# Patient Record
Sex: FEMALE | Race: WHITE | NOT HISPANIC OR LATINO | URBAN - METROPOLITAN AREA
[De-identification: names, ages, dates, MRNs, and addresses within clinical notes are randomized per-mention and may not be internally consistent; named-entity substitution may affect disease eponyms.]

---

## 2023-04-05 LAB
BASOPHILS (10*3/UL) IN BLOOD BY AUTOMATED COUNT: 0.04 X10E9/L (ref 0–0.1)
BASOPHILS/100 LEUKOCYTES IN BLOOD BY AUTOMATED COUNT: 0.8 % (ref 0–2)
C REACTIVE PROTEIN (MG/L) IN SER/PLAS: 0.22 MG/DL
EOSINOPHILS (10*3/UL) IN BLOOD BY AUTOMATED COUNT: 0.05 X10E9/L (ref 0–0.4)
EOSINOPHILS/100 LEUKOCYTES IN BLOOD BY AUTOMATED COUNT: 1 % (ref 0–6)
ERYTHROCYTE DISTRIBUTION WIDTH (RATIO) BY AUTOMATED COUNT: 13.5 % (ref 11.5–14.5)
ERYTHROCYTE MEAN CORPUSCULAR HEMOGLOBIN CONCENTRATION (G/DL) BY AUTOMATED: 32.6 G/DL (ref 32–36)
ERYTHROCYTE MEAN CORPUSCULAR VOLUME (FL) BY AUTOMATED COUNT: 92 FL (ref 80–100)
ERYTHROCYTES (10*6/UL) IN BLOOD BY AUTOMATED COUNT: 4.31 X10E12/L (ref 4–5.2)
FIBRINOGEN (MG/DL) IN PPP BY COAGULATION ASSAY: 255 MG/DL (ref 200–400)
HEMATOCRIT (%) IN BLOOD BY AUTOMATED COUNT: 39.6 % (ref 36–46)
HEMOGLOBIN (G/DL) IN BLOOD: 12.9 G/DL (ref 12–16)
IMMATURE GRANULOCYTES/100 LEUKOCYTES IN BLOOD BY AUTOMATED COUNT: 0.2 % (ref 0–0.9)
LEUKOCYTES (10*3/UL) IN BLOOD BY AUTOMATED COUNT: 5.2 X10E9/L (ref 4.4–11.3)
LYMPHOCYTES (10*3/UL) IN BLOOD BY AUTOMATED COUNT: 1.58 X10E9/L (ref 0.8–3)
LYMPHOCYTES/100 LEUKOCYTES IN BLOOD BY AUTOMATED COUNT: 30.6 % (ref 13–44)
MONOCYTES (10*3/UL) IN BLOOD BY AUTOMATED COUNT: 0.3 X10E9/L (ref 0.05–0.8)
MONOCYTES/100 LEUKOCYTES IN BLOOD BY AUTOMATED COUNT: 5.8 % (ref 2–10)
NEUTROPHILS (10*3/UL) IN BLOOD BY AUTOMATED COUNT: 3.18 X10E9/L (ref 1.6–5.5)
NEUTROPHILS/100 LEUKOCYTES IN BLOOD BY AUTOMATED COUNT: 61.6 % (ref 40–80)
NRBC (PER 100 WBCS) BY AUTOMATED COUNT: 0 /100 WBC (ref 0–0)
PLATELETS (10*3/UL) IN BLOOD AUTOMATED COUNT: 301 X10E9/L (ref 150–450)
SEDIMENTATION RATE, ERYTHROCYTE: 12 MM/H (ref 0–30)

## 2023-05-23 LAB
ALANINE AMINOTRANSFERASE (SGPT) (U/L) IN SER/PLAS: 19 U/L (ref 7–45)
ALBUMIN (G/DL) IN SER/PLAS: 3.9 G/DL (ref 3.4–5)
ALKALINE PHOSPHATASE (U/L) IN SER/PLAS: 52 U/L (ref 33–136)
ANION GAP IN SER/PLAS: 14 MMOL/L (ref 10–20)
ASPARTATE AMINOTRANSFERASE (SGOT) (U/L) IN SER/PLAS: 21 U/L (ref 9–39)
BASOPHILS (10*3/UL) IN BLOOD BY AUTOMATED COUNT: 0.04 X10E9/L (ref 0–0.1)
BASOPHILS/100 LEUKOCYTES IN BLOOD BY AUTOMATED COUNT: 0.8 % (ref 0–2)
BILIRUBIN TOTAL (MG/DL) IN SER/PLAS: 0.7 MG/DL (ref 0–1.2)
CALCIUM (MG/DL) IN SER/PLAS: 9.8 MG/DL (ref 8.6–10.6)
CARBON DIOXIDE, TOTAL (MMOL/L) IN SER/PLAS: 26 MMOL/L (ref 21–32)
CHLORIDE (MMOL/L) IN SER/PLAS: 107 MMOL/L (ref 98–107)
CHOLESTEROL (MG/DL) IN SER/PLAS: 233 MG/DL (ref 0–199)
CHOLESTEROL IN HDL (MG/DL) IN SER/PLAS: 72.9 MG/DL
CHOLESTEROL/HDL RATIO: 3.2
CREATININE (MG/DL) IN SER/PLAS: 0.72 MG/DL (ref 0.5–1.05)
EOSINOPHILS (10*3/UL) IN BLOOD BY AUTOMATED COUNT: 0.09 X10E9/L (ref 0–0.4)
EOSINOPHILS/100 LEUKOCYTES IN BLOOD BY AUTOMATED COUNT: 1.8 % (ref 0–6)
ERYTHROCYTE DISTRIBUTION WIDTH (RATIO) BY AUTOMATED COUNT: 14.5 % (ref 11.5–14.5)
ERYTHROCYTE MEAN CORPUSCULAR HEMOGLOBIN CONCENTRATION (G/DL) BY AUTOMATED: 32 G/DL (ref 32–36)
ERYTHROCYTE MEAN CORPUSCULAR VOLUME (FL) BY AUTOMATED COUNT: 94 FL (ref 80–100)
ERYTHROCYTES (10*6/UL) IN BLOOD BY AUTOMATED COUNT: 3.85 X10E12/L (ref 4–5.2)
GFR FEMALE: 85 ML/MIN/1.73M2
GLUCOSE (MG/DL) IN SER/PLAS: 94 MG/DL (ref 74–99)
HEMATOCRIT (%) IN BLOOD BY AUTOMATED COUNT: 36.3 % (ref 36–46)
HEMOGLOBIN (G/DL) IN BLOOD: 11.6 G/DL (ref 12–16)
IMMATURE GRANULOCYTES/100 LEUKOCYTES IN BLOOD BY AUTOMATED COUNT: 0.2 % (ref 0–0.9)
LDL: 137 MG/DL (ref 0–99)
LEUKOCYTES (10*3/UL) IN BLOOD BY AUTOMATED COUNT: 4.9 X10E9/L (ref 4.4–11.3)
LYMPHOCYTES (10*3/UL) IN BLOOD BY AUTOMATED COUNT: 1.18 X10E9/L (ref 0.8–3)
LYMPHOCYTES/100 LEUKOCYTES IN BLOOD BY AUTOMATED COUNT: 24.2 % (ref 13–44)
MONOCYTES (10*3/UL) IN BLOOD BY AUTOMATED COUNT: 0.23 X10E9/L (ref 0.05–0.8)
MONOCYTES/100 LEUKOCYTES IN BLOOD BY AUTOMATED COUNT: 4.7 % (ref 2–10)
NEUTROPHILS (10*3/UL) IN BLOOD BY AUTOMATED COUNT: 3.32 X10E9/L (ref 1.6–5.5)
NEUTROPHILS/100 LEUKOCYTES IN BLOOD BY AUTOMATED COUNT: 68.3 % (ref 40–80)
NRBC (PER 100 WBCS) BY AUTOMATED COUNT: 0 /100 WBC (ref 0–0)
PLATELETS (10*3/UL) IN BLOOD AUTOMATED COUNT: 276 X10E9/L (ref 150–450)
POTASSIUM (MMOL/L) IN SER/PLAS: 3.8 MMOL/L (ref 3.5–5.3)
PROTEIN TOTAL: 6.6 G/DL (ref 6.4–8.2)
SODIUM (MMOL/L) IN SER/PLAS: 143 MMOL/L (ref 136–145)
THYROTROPIN (MIU/L) IN SER/PLAS BY DETECTION LIMIT <= 0.05 MIU/L: 1.13 MIU/L (ref 0.44–3.98)
TRIGLYCERIDE (MG/DL) IN SER/PLAS: 117 MG/DL (ref 0–149)
UREA NITROGEN (MG/DL) IN SER/PLAS: 9 MG/DL (ref 6–23)
VLDL: 23 MG/DL (ref 0–40)

## 2024-02-05 PROBLEM — H65.92 FLUID LEVEL BEHIND TYMPANIC MEMBRANE OF LEFT EAR: Status: ACTIVE | Noted: 2024-02-05

## 2024-02-05 PROBLEM — H93.8X2 SENSATION OF PLUGGED EAR ON LEFT SIDE: Status: ACTIVE | Noted: 2024-02-05

## 2024-02-05 PROBLEM — H93.12 TINNITUS, LEFT EAR: Status: ACTIVE | Noted: 2024-02-05

## 2024-02-05 RX ORDER — MIRABEGRON 50 MG/1
1 TABLET, EXTENDED RELEASE ORAL DAILY
COMMUNITY
Start: 2022-02-25

## 2024-02-05 RX ORDER — HYDROCHLOROTHIAZIDE 12.5 MG/1
1 TABLET ORAL DAILY
COMMUNITY
Start: 2021-03-16

## 2024-02-05 RX ORDER — ATORVASTATIN CALCIUM 10 MG/1
1 TABLET, FILM COATED ORAL DAILY
COMMUNITY
Start: 2021-12-27

## 2024-02-05 RX ORDER — ELECTROLYTES/DEXTROSE
SOLUTION, ORAL ORAL
COMMUNITY

## 2024-02-05 RX ORDER — LOSARTAN POTASSIUM 100 MG/1
1 TABLET ORAL DAILY
COMMUNITY
Start: 2022-05-24

## 2024-02-05 RX ORDER — FERROUS GLUCONATE 256(28)MG
TABLET ORAL
COMMUNITY

## 2024-02-05 RX ORDER — PNV NO.95/FERROUS FUM/FOLIC AC 28MG-0.8MG
TABLET ORAL
COMMUNITY

## 2024-02-07 ENCOUNTER — OFFICE VISIT (OUTPATIENT)
Dept: OTOLARYNGOLOGY | Facility: CLINIC | Age: 79
End: 2024-02-07
Payer: MEDICARE

## 2024-02-07 VITALS
HEIGHT: 62 IN | DIASTOLIC BLOOD PRESSURE: 73 MMHG | WEIGHT: 193.8 LBS | RESPIRATION RATE: 16 BRPM | SYSTOLIC BLOOD PRESSURE: 150 MMHG | BODY MASS INDEX: 35.66 KG/M2 | TEMPERATURE: 97.9 F | HEART RATE: 66 BPM | OXYGEN SATURATION: 99 %

## 2024-02-07 DIAGNOSIS — R05.9 COUGH, UNSPECIFIED TYPE: ICD-10-CM

## 2024-02-07 DIAGNOSIS — R49.0 MUSCLE TENSION DYSPHONIA: ICD-10-CM

## 2024-02-07 DIAGNOSIS — R49.9 CHANGE IN VOICE: Primary | ICD-10-CM

## 2024-02-07 PROBLEM — I10 HYPERTENSION: Status: ACTIVE | Noted: 2024-02-07

## 2024-02-07 PROCEDURE — 3077F SYST BP >= 140 MM HG: CPT | Performed by: NURSE PRACTITIONER

## 2024-02-07 PROCEDURE — 1159F MED LIST DOCD IN RCRD: CPT | Performed by: NURSE PRACTITIONER

## 2024-02-07 PROCEDURE — 31575 DIAGNOSTIC LARYNGOSCOPY: CPT | Performed by: NURSE PRACTITIONER

## 2024-02-07 PROCEDURE — 99214 OFFICE O/P EST MOD 30 MIN: CPT | Performed by: NURSE PRACTITIONER

## 2024-02-07 PROCEDURE — 3078F DIAST BP <80 MM HG: CPT | Performed by: NURSE PRACTITIONER

## 2024-02-07 PROCEDURE — 1036F TOBACCO NON-USER: CPT | Performed by: NURSE PRACTITIONER

## 2024-02-07 PROCEDURE — 1126F AMNT PAIN NOTED NONE PRSNT: CPT | Performed by: NURSE PRACTITIONER

## 2024-02-07 RX ORDER — PANTOPRAZOLE SODIUM 40 MG/1
TABLET, DELAYED RELEASE ORAL
COMMUNITY
Start: 2014-08-24

## 2024-02-07 RX ORDER — AMOXICILLIN 500 MG/1
CAPSULE ORAL
COMMUNITY
Start: 2012-01-24

## 2024-02-07 RX ORDER — METHYLPREDNISOLONE 4 MG/1
TABLET ORAL
Qty: 21 TABLET | Refills: 0 | Status: SHIPPED | OUTPATIENT
Start: 2024-02-07 | End: 2024-02-14

## 2024-02-07 SDOH — ECONOMIC STABILITY: FOOD INSECURITY: WITHIN THE PAST 12 MONTHS, YOU WORRIED THAT YOUR FOOD WOULD RUN OUT BEFORE YOU GOT MONEY TO BUY MORE.: NEVER TRUE

## 2024-02-07 SDOH — ECONOMIC STABILITY: FOOD INSECURITY: WITHIN THE PAST 12 MONTHS, THE FOOD YOU BOUGHT JUST DIDN'T LAST AND YOU DIDN'T HAVE MONEY TO GET MORE.: NEVER TRUE

## 2024-02-07 ASSESSMENT — PATIENT HEALTH QUESTIONNAIRE - PHQ9
2. FEELING DOWN, DEPRESSED OR HOPELESS: NOT AT ALL
1. LITTLE INTEREST OR PLEASURE IN DOING THINGS: NOT AT ALL
SUM OF ALL RESPONSES TO PHQ9 QUESTIONS 1 AND 2: 0

## 2024-02-07 ASSESSMENT — LIFESTYLE VARIABLES
AUDIT TOTAL SCORE: 1
HAS A RELATIVE, FRIEND, DOCTOR, OR ANOTHER HEALTH PROFESSIONAL EXPRESSED CONCERN ABOUT YOUR DRINKING OR SUGGESTED YOU CUT DOWN: NO
AUDIT-C TOTAL SCORE: 1
HOW MANY STANDARD DRINKS CONTAINING ALCOHOL DO YOU HAVE ON A TYPICAL DAY: 1 OR 2
HOW OFTEN DO YOU HAVE SIX OR MORE DRINKS ON ONE OCCASION: NEVER
SKIP TO QUESTIONS 9-10: 1
HOW OFTEN DO YOU HAVE A DRINK CONTAINING ALCOHOL: MONTHLY OR LESS
HAVE YOU OR SOMEONE ELSE BEEN INJURED AS A RESULT OF YOUR DRINKING: NO

## 2024-02-07 ASSESSMENT — COLUMBIA-SUICIDE SEVERITY RATING SCALE - C-SSRS
2. HAVE YOU ACTUALLY HAD ANY THOUGHTS OF KILLING YOURSELF?: NO
6. HAVE YOU EVER DONE ANYTHING, STARTED TO DO ANYTHING, OR PREPARED TO DO ANYTHING TO END YOUR LIFE?: NO
1. IN THE PAST MONTH, HAVE YOU WISHED YOU WERE DEAD OR WISHED YOU COULD GO TO SLEEP AND NOT WAKE UP?: NO

## 2024-02-07 ASSESSMENT — ENCOUNTER SYMPTOMS
DEPRESSION: 0
LOSS OF SENSATION IN FEET: 0
OCCASIONAL FEELINGS OF UNSTEADINESS: 0

## 2024-02-07 ASSESSMENT — PAIN SCALES - GENERAL: PAINLEVEL: 0-NO PAIN

## 2024-02-07 NOTE — PROGRESS NOTES
Subjective   Patient ID: Lonnie Lehman is a 78 y.o. female who presents for Ear Fullness and Laryngitis.    HPI  INITIAL VISIT 6/13/2022:  Lonnie Lehman is a 76 year-old female here for evaluation of her left ear. She is having hearing loss in the left ear that started about 1 month ago. She did have ticks on her around that time. She did have a cold before the hearing loss. No ear pain. No ear drainage. She did have mild blood from the ear a few weeks ago. No tinnitus. She feels something in her ear. Sometimes when she lays down she can get dizzy. No previous ear surgery. No loud noise exposure. No family history of hearing loss.     2/7/2024:  She had COVID 12/12/2023. Prior to that she was having some clogged sensation in the right ear and a pulsing. Called PCP when she had COVID and was prescribed Paxlovid. She was coughing a lot and lost her voice around [place]. She is still having some voice changes. No throat pain or trouble swallowing. Having to throat clear in the mornings.   She has been doing Flonase and Mucinex and Lozangez.  She was on amoxicillin for a dental infection.   She takes Pantoprazole daily.    Patient Active Problem List   Diagnosis    Tinnitus, left ear    Sensation of plugged ear on left side    Fluid level behind tympanic membrane of left ear    Cataracts, both eyes    Hypertension    Otalgia of left ear    Preglaucoma    Vitreous floaters     History reviewed. No pertinent surgical history.    Review of Systems    All other systems have been reviewed and are negative for complaints except for those mentioned in history of present illness, past medical history and problem list.    Objective   Physical Exam    Constitutional: No fever, chills, weight loss or weight gain  General appearance: Appears well, well-nourished, well groomed. No acute distress.    Communication: Normal communication    Psychiatric: Oriented to person, place and time. Normal mood and affect.    Neurologic:  Cranial nerves II-XII grossly intact and symmetric bilaterally.    Head and Face:  Head: Atraumatic with no masses, lesions or scarring.  Face: Normal symmetry. No scars or deformities.  TMJ: Normal, no trismus.    Eyes: Conjunctiva not edematous or erythematous.     Right Ear: External inspection of ear with no deformity, scars, or masses. EAC is clear.  TM is intact with no sign of infection, effusion, or retraction.  No perforation seen.     Left Ear: External inspection of ear with no deformity, scars, or masses. EAC is clear.  TM is intact with no sign of infection, effusion, or retraction.  No perforation seen.     Nose: External inspection of nose: No nasal lesions, lacerations or scars. Anterior rhinoscopy with limited visualization past the inferior turbinates. No tenderness on frontal or maxillary sinus palpation.    Oral Cavity/Mouth: Oral cavity and oropharynx mucosa moist and pink. No lesions or masses. Dentition normal. Tonsils appear normal. Uvula is midline. Tongue with no masses or lesions. Tongue with good mobility. The oropharynx is clear.    Neck: Normal appearing, symmetric, trachea midline.     Cardiovascular: Examination of peripheral vascular system shows no clubbing or cyanosis.    Respiratory: No respiratory distress increased work of breathing. Inspection of the chest with symmetric chest expansion and normal respiratory effort.    Skin: No head and neck rashes.    Lymph nodes: No adenopathy.     Laryngoscopy    Date/Time: 2/7/2024 4:11 PM    Performed by: BLANCO Carlisle  Authorized by: BLANCO Carlisle    Procedure details:     Indications: hoarseness, dysphagia, or aspiration      Medication:  Afrin (4% topical lidocaine)    Instrument: flexible fiberoptic laryngoscope      Scope location: right nare    Sinus:     Right nasopharynx:       normal    Left nasopharynx:       normal  Mouth:     Posterior pharyngeal wall: normal      Oropharynx:       normal       Vallecula:       normal      Base of tongue:       normal      Epiglottis:       normal    Throat:     Right hypopharynx:       Positive for: inflammation      Left hypopharynx:       Positive for: inflammation      Pyriform sinus:       normal      False vocal cords:       normal      True vocal cords:       normal        Positive for: inflammation        Pertinent negatives: no immobility, no reduced mobility and no lesion      Assessment/Plan   Diagnoses and all orders for this visit:  Laryngoscopy performed. Mild inflammation of the larynx. No growth, cords are moving well. I feel her voice changes are secondary to COVID 19- and now she is having some MTD. Also may be secondary to her lingering cough from COVID.   Change in voice, cough  -     methylPREDNISolone (Medrol Dospak) 4 mg tablets; Follow schedule on package instructions. Patient denies diabetes or cardiac issues.   -     Referral to Speech Therapy; Future    Patient will update me after completing Medrol Dosepak, and we will plan to follow up after BLANCO Bear 02/07/24 1:07 PM

## 2024-02-13 ENCOUNTER — EVALUATION (OUTPATIENT)
Dept: SPEECH THERAPY | Facility: CLINIC | Age: 79
End: 2024-02-13
Payer: MEDICARE

## 2024-02-13 DIAGNOSIS — R49.0 DYSPHONIA: ICD-10-CM

## 2024-02-13 DIAGNOSIS — R49.9 CHANGE IN VOICE: Primary | ICD-10-CM

## 2024-02-13 PROBLEM — R49.1 APHONIA: Status: ACTIVE | Noted: 2024-02-13

## 2024-02-13 PROCEDURE — 92524 BEHAVRAL QUALIT ANALYS VOICE: CPT | Mod: GN | Performed by: SPEECH-LANGUAGE PATHOLOGIST

## 2024-02-13 ASSESSMENT — PAIN - FUNCTIONAL ASSESSMENT: PAIN_FUNCTIONAL_ASSESSMENT: 0-10

## 2024-02-13 ASSESSMENT — PAIN SCALES - GENERAL: PAINLEVEL_OUTOF10: 0 - NO PAIN

## 2024-02-13 NOTE — PROGRESS NOTES
Speech-Language Pathology    Adult Outpatient Speech-Language Voice Assessment    Patient Name: Lonnie Lehman  MRN: [id number]  : 1945  Today's Date: 24  Time Calculation  Start Time: 1345  Stop Time: 1445  Time Calculation (min): 60 min    Current Problem:   Dysphonia    SLP Assessment:  Pt. Is a pleasant 78 year old female.  She presents with a severe dysphonia.  Pt reported a change in voice after having COVID with an increased cough, 2023. Pt demonstrated a quiet, strained voice this date with some vocal hitching and diplophonia during s/z ratio and maximum phonation time assessments.   Pt reported to be a frequent voice user as she takes care of her three grandchildren aged 18, 15, and 8 years old. She also reported taking care of her , who is hard of hearing. Pt reported talking to him up close, and at times, with an increased volume.     Clinician provided education on ways to reduce vocal usage and strain.   Clinician recommended that the pt complete 5 days of vocal rest, to aid in this clinician suggested texting/writing things down to communicate with others during vocal rest. Clinician also educated pt on effective whispering (whispering without a voice) to aid in the recovery process.     Lonnie Lehman will benefit from skilled speech therapy at this time to address above deficits.     Related Medical History:  Pt had COVID in 2023. She had a lingering cough after COVID, resulting in some voice changes. Pt had a laryngoscopy on 24, pt was found to have mild inflammation of the larynx, with vocal folds moving well per ENT report. Pt reported having hearing loss in left ear in addition to COVID, then switching to her right ear. Pt reported that there was pain in the left ear, however this problem has been resolved.    SLP Plan:  Lonnie Lehman is recommended to be seen for Skilled Speech Therapy two times per week for six weeks.  This was reviewed with family and they  "are in agreement of this.     Goals:  By discharge Lonnie Lehman will:  Long Term Goal(s):   1. Patient will return to prior level of function with voicing to communicate with a clear vocal quality to be able to be understood by others to get her wants/needs met effectively.  Short Term Goal(s):   1. Patient will practice respiratory exercises with return demo with 90% accuracy. Set 2/13/24.  2. Patient will be able to phonate with good vocal quality for sustained phonation at 5-10 seconds with mod cues on 90% of trials.  Set 2/13/24.  3.  Patient will be able to achieve voicing at the word level of speech at 90% with mod cues.  Set 2/13/24.   4. Patient will complete vocal cord relaxation exercises with return demo with 90% accuracy. Set 2/13/24.  5. Patient will increase breath support to maintain sustained phonation of \"ah\" for 14.4 seconds with 90% accuracy, independently. Set 2/13/24.  6. Patient will demonstrate understanding and importance of vocal hygiene with visual supports and return demonstration at 90% accuracy. Set 2/13/24.  7. Patient will complete 4-5 days of vocal rest to increase ability to return to PLOF. Set 2/13/24.  8. Patient/family education to be provided each session. Set 2/13/24.    Prognosis: Good  Pt/family agrees to the reviewed goals and Plan for therapy.    Plan of care was developed with input and agreement by the pt/family.    Subjective:   Lonnie Lehman was seen 1-on-1 this date.  Lonnie participated well throughout the evaluation.  Date ranges for therapy: 2/12/24-4/13/24  Referred By: Jannette Whitt CNP  Date of Onset: 12/13/23  Reason for Referral: Voice Changes  Chief Complaint: Unable to speak   Prior Level of Function: WFL  Previous Therapies: None  Respiratory Status: Room Air  Hearing: WFL  Vision: WFL  No overt symptoms/signs of abuse/neglect.   Precautions: None  Pt/family prefer to learn via demonstration, printed materials, performance, and " explanation/discussion.    Pain:  Pain Assessment: 0-10   Pain Score: 0 - No pain    Insurance:  Reviewed: Yes  Number of Authorized Treatments : auth required after eval  Total Number of Visits: 1  Certification Period: 24 Endin24    Objective:  Sustained phonation was utilized this date and pt achieved an average of 8.89 seconds across three trials.  This is compared to norms of females aged 65 and older with a median score of 14.4 seconds.     Pt completed a full Voice Handicap Index.  Pt scored as follows:  Functional: 22/40  Physical 31/40  Emotional 35/40  The lower the score, the less notable the concern is for the pt at this time.  VHI values of 0-11 are classified as grade 0 suffering (almost certainly not noticeable), while values of 12-28 reflect grade 1 (more likely unnoticeable than conspicuous) suffering; values of 29-56 reflect grade 2 suffering (more probably noticeable than not), and values of  suggest a classification of certainly noticeable and are graded as grade 3 suffering.  Pt is currently at a grade 3. Pt did express having increased difficulty with others hearing her, as well as wanting to interact with others. Pt reported feeling angry about her voice, and inability to speak. Pt also reported pain with speaking, and that her voice gets worse throughout the day.     A S/Z ratio was obtained this date of 14.75/8.13=1.18.  WFL is being able to sustain both 'S' and 'Z' for about 20-25 seconds and a ratio of 1.  This is indicative of laryngeal impairment, characterized by a quiet strained voice.     The results of this evaluation indicate a severe dysphonia characterized by strain, and inability to sustain voicing. This is due to previous medical history of COVID, with a persistent cough as well as vocal misuse. 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 Pt is not using adequate vocal hygiene. Pt reports taking  Flonase and Mucinex following COVID recovery. Pt is currently on a steroid to reduce inflammation and swelling.     Treatment Provided:   No treatment provided this date. Education was provided on ways to improve voice at home including quiet whisper, and vocal rest for 4-5 days.     Outpatient Education:   Reviewed results of today's assessment.    Reviewed plan for Therapy and Lonnie Lehman and family were in agreement of this.     Addie Avina - Student Clinician

## 2024-02-14 PROBLEM — R49.0 DYSPHONIA: Status: ACTIVE | Noted: 2024-02-14

## 2024-02-23 ENCOUNTER — TREATMENT (OUTPATIENT)
Dept: SPEECH THERAPY | Facility: CLINIC | Age: 79
End: 2024-02-23
Payer: MEDICARE

## 2024-02-23 DIAGNOSIS — R49.0 DYSPHONIA: Primary | ICD-10-CM

## 2024-02-23 PROCEDURE — 2700000081 HC SLP EMST 150 EXP MUSCLE STRGTH TRAINER: Performed by: SPEECH-LANGUAGE PATHOLOGIST

## 2024-02-23 PROCEDURE — 92507 TX SP LANG VOICE COMM INDIV: CPT | Mod: GN | Performed by: SPEECH-LANGUAGE PATHOLOGIST

## 2024-02-23 ASSESSMENT — PAIN SCALES - GENERAL: PAINLEVEL_OUTOF10: 0 - NO PAIN

## 2024-02-23 ASSESSMENT — PAIN - FUNCTIONAL ASSESSMENT: PAIN_FUNCTIONAL_ASSESSMENT: 0-10

## 2024-02-23 NOTE — PROGRESS NOTES
Speech-Language Pathology    Outpatient Speech-Language Pathology Treatment    Patient Name: Lonnie Lehman  MRN: [id number]  : 1945  Today's Date: 24  Time Calculation  Start Time: 730  Stop Time: 815  Time Calculation (min): 45 min    Current Problem:  Dysphonia     SLP Assessment  Pt was provided with an EMST-150 this date. Education was provided on how to use, and wash the device. Pt completed 5 reps of 5 with good effort.   Pt completed 20 yawn-sighs this date with good effort. Pt benefited from moderate verbal and visual cues to phonate with good vocal quality throughout this exercise.  Pt reported feeling stained this date, partial to inadequate vocal usage. Pt also reporting buying a whiteboard to write on to reduce vocal usage in communication with others.   Education was provided about vocal hygiene, and its benefits throughout the body, and voice. Pt expressed understanding of this.     Pt completed the MoCA this date, scoring a 17/30. A normal score is >26 out of 30. This indicates a moderate cognitive disorder, characterized by deficits in memory and attention.     Goals to be added:     LTGs:  Pt will increase cognitive linguistic skills to be able to fully participate in daily tasks without deficit at PLOF. SET 24.    STGs:  Patient will demonstrate an improvement in short term memory abilities by recalling 5-7 pieces of information with/without delay and with/without distraction each session.   SET 24.     Patient will learn and use memory/cognitive compensatory strategies with return demo with 90% accuracy.  SET 24.     3. Patient will be able to complete high level problem solving tasks (e.g. divergent/convergent tasks, sequencing, reasoning, etc.) at 80% with mod cues.   SET 24.    Plan  SLP Tx Plan:  Continue with POC.   SLP Plan: Skilled SLP  SLP Frequency: 1x/week  Duration: 12 weeks.    Subjective  Lonnie Lehman was pleasant and participated well throughout  "the session.     General Visit Info  Certification Period Start Date: 02/13/24 Certification Period End Date: 05/13/24   Number of Authorized Treatments : auth required after eval  Total Number of Visits : 2  Insurance Reviewed this date:  yes     Pain  Pain Assessment: Pain Assessment: 0-10  Pain Score: Pain Score: 0 - No pain    Objective  By discharge Lonnie Lehman will:  Long Term Goal(s):   1. Patient will return to prior level of function with voicing to communicate with a clear vocal quality to be able to be understood by others to get her wants/needs met effectively.    Short Term Goal(s):   1. Patient will practice respiratory exercises with return demo with 90% accuracy. Set 2/13/24.  ONGOING. 80% accuracy, increased to 90% with min to mod cues.     2. Patient will be able to phonate with good vocal quality for sustained phonation at 5-10 seconds with mod cues on 90% of trials.  Set 2/13/24.  ONGOING. Phonate during yawn-sigh exercise for 1-2 seconds at 40% accuracy, increased to 60% with mod cues.  This was still slightly rough in quality at times.     3.  Patient will be able to achieve voicing at the word level of speech at 90% with mod cues.  Set 2/13/24.   NOT ADDRESSED.     4. Patient will complete vocal cord relaxation exercises with return demo with 90% accuracy. Set 2/13/24.  ONGOING. Completed at 60% accuracy independently, increased to 80% with mod cues.     5. Patient will increase breath support to maintain sustained phonation of \"ah\" for 14.4 seconds with 90% accuracy, independently. Set 2/13/24.  NOT ADDRESSED.     6. Patient will demonstrate understanding and importance of vocal hygiene with visual supports and return demonstration at 90% accuracy. Set 2/13/24.  ONGOING. Pt expressed understanding after reviewing vocal hygiene handout.     7. Patient will complete 4-5 days of vocal rest to increase ability to return to PLOF. Set 2/13/24.  GOAL MET. Pt reported not utilizing complete vocal " rest, but a confidential voice and dry erase board to communicate with others.  She is continuing to use a soft voice at home aside from when targeting voicing homework.     8. Patient/family education to be provided each session. Set 2/13/24.  ONGOING.   Education  Education was provided to pt/family and reviewed how to carryover strategies learned in session to home.      Addie Avina - Student Clinician

## 2024-02-26 ENCOUNTER — APPOINTMENT (OUTPATIENT)
Dept: SPEECH THERAPY | Facility: CLINIC | Age: 79
End: 2024-02-26
Payer: MEDICARE

## 2024-02-28 ENCOUNTER — TREATMENT (OUTPATIENT)
Dept: SPEECH THERAPY | Facility: CLINIC | Age: 79
End: 2024-02-28
Payer: MEDICARE

## 2024-02-28 ENCOUNTER — APPOINTMENT (OUTPATIENT)
Dept: OTOLARYNGOLOGY | Facility: CLINIC | Age: 79
End: 2024-02-28
Payer: MEDICARE

## 2024-02-28 DIAGNOSIS — R49.0 DYSPHONIA: ICD-10-CM

## 2024-02-28 PROCEDURE — 92507 TX SP LANG VOICE COMM INDIV: CPT | Mod: GN | Performed by: SPEECH-LANGUAGE PATHOLOGIST

## 2024-02-28 ASSESSMENT — PAIN - FUNCTIONAL ASSESSMENT: PAIN_FUNCTIONAL_ASSESSMENT: 0-10

## 2024-02-28 ASSESSMENT — PAIN SCALES - GENERAL: PAINLEVEL_OUTOF10: 0 - NO PAIN

## 2024-02-28 NOTE — PROGRESS NOTES
"Speech-Language Pathology    Outpatient Speech-Language Pathology Treatment    Patient Name: Lonnie Lehman  MRN: [id number]  : 1945  Today's Date: 24  Time Calculation  Start Time: 915  Stop Time: 1000  Time Calculation (min): 45 min    Current Problem:  Dysphonia     SLP Assessment  Pt completed 25 yawn-sighs thus date with good effort. Pt benefited from max verbal and visual cues to use diaphragm to produce good vocal quality.   Pt participated in adduction exercises this date. Pt was asked to \"bear down\" using the arms of a chair, or holding hands together and pulling apart, while making a \"huh\" like noise. Pt benefited from max verbal and visual cues to use diaphragm in this exercise. Pt gained independence accurately completing adduction and yawn-sigh exercises as the session continued, she did still benefit from moderate verbal and visual cues to complete fully.     Pt was able to voice at the word and phrase level of speech this date independently. However; benefited from max verbal, visual, and written cues to use diaphragm. When not utilizing appropriate breath support, pt would often self-correct breathing to use of diaphragm.   Pt expressed difficulty using the EMST-150 at home, but did not bring to the session this date. At the moment, it is unclear if she is using the EMST-150 appropriately, and she is unsure if she is using appropriately. Pt was reminded to bring this to each session to practice breath support, and use of diaphragm.     Education was provided about using diaphragm for breathing, and while talking for increased breath support. Strategies and exercises were written down for pt for easier recall.     Plan  SLP Tx Plan:  Continue with POC.   SLP Plan: Skilled SLP  SLP Frequency: 1x/week  Duration: 12 weeks.    Subjective  Lonnie Lehman was pleasant and participated well throughout the session.     General Visit Info  Certification Period Start Date: 24 Certification " "Period End Date: 05/13/24   Number of Authorized Treatments : auth required after eval  Total Number of Visits : 3  Insurance Reviewed this date:  yes     Pain  Pain Assessment: 0-10  Pain Score: 0 - No pain    Objective  By discharge Lonnie Lehman will:  Long Term Goal(s):   1. Patient will return to prior level of function with voicing to communicate with a clear vocal quality to be able to be understood by others to get her wants/needs met effectively.    2. Pt will increase cognitive linguistic skills to be able to fully participate in daily tasks without deficit at PLOF.    Short Term Goal(s):   1. Patient will practice respiratory exercises with return demo with 90% accuracy.   ONGOING. Pt was able to engage diaphragm in tasks at 80% with mod to max cues.     2. Patient will be able to phonate with good vocal quality for sustained phonation at 5-10 seconds with mod cues on 90% of trials.  ONGOING.  On words in conversation at 60% with max cues and on /ah/ for 2 seconds at 70% with max cues.     3.  Patient will be able to achieve voicing at the word level of speech at 90% with mod cues.  ONGOING. Pt was able to voice at word and phrase level, during conversation 50% accuracy independently, increased to 80% with max assistance. Pt would often self-correct breathing to diaphragm to increase loudness and vocal quality    4. Patient will complete vocal cord relaxation exercises with return demo with 90% accuracy.  ONGOING. Phonate during yawn-sigh exercise for 2-3 seconds at 40% accuracy, increased to 80% with max cues. Pt demonstrated glottal borden towards the end of sustained phonation 70% of the time, however reduced with mod cues.     5. Patient will increase breath support to maintain sustained phonation of \"ah\" for 14.4 seconds with 90% accuracy, independently.  ONGOING. Sustained phonation was not targeted this session, however; pt was able to phonate at word and phrase level during conversation for 2-3 " seconds, with appropriate breath support giving mod cues.     6. Patient will demonstrate understanding and importance of vocal hygiene with visual supports and return demonstration at 90% accuracy.  ONGOING. Pt had questions about drinking soda and the effects on voice. Education was provided about appropriate intake of fluids.    7. Patient will demonstrate an improvement in short term memory abilities by recalling 5-7 pieces of information with/without delay and with/without distraction each session.   NOT ADDRESSED.      8. Patient will learn and use memory/cognitive compensatory strategies with return demo with 90% accuracy.  NOT ADDRESSED.     9.  Patient will be able to complete high level problem solving tasks (e.g. divergent/convergent tasks, sequencing, reasoning, etc.) at 80% with mod cues.   NOT ADDRESSED.     10. Patient will complete 4-5 days of vocal rest to increase ability to return to PLOF.  GOAL MET 2/23/24.     11. Patient/family education to be provided each session.  ONGOING. Education provided about exercises to complete at home.     Education  Education was provided to pt/family and reviewed how to carryover strategies learned in session to home.      Addie Avina - Student Clinician

## 2024-02-29 ENCOUNTER — TREATMENT (OUTPATIENT)
Dept: SPEECH THERAPY | Facility: CLINIC | Age: 79
End: 2024-02-29
Payer: MEDICARE

## 2024-02-29 DIAGNOSIS — R49.0 DYSPHONIA: ICD-10-CM

## 2024-02-29 PROCEDURE — 92507 TX SP LANG VOICE COMM INDIV: CPT | Mod: GN | Performed by: SPEECH-LANGUAGE PATHOLOGIST

## 2024-02-29 ASSESSMENT — PAIN - FUNCTIONAL ASSESSMENT: PAIN_FUNCTIONAL_ASSESSMENT: 0-10

## 2024-02-29 ASSESSMENT — PAIN SCALES - GENERAL: PAINLEVEL_OUTOF10: 0 - NO PAIN

## 2024-02-29 NOTE — PROGRESS NOTES
"Speech-Language Pathology    Outpatient Speech-Language Pathology Treatment    Patient Name: Lonnie Lehman  MRN: [id number]  : 1945  Today's Date: 24  Time Calculation  Start Time: 1045  Stop Time: 1115  Time Calculation (min): 30 min    Current Problem:  Dysphonia     SLP Assessment  Pt 5 reps of 5 using EMST-150 set at 30 with good effort. She benefited from moderate cues to blow harder into the EMST-150, as well as to take breaks to catch breath.   Pt was able to complete adduction exercises this date after expressing that she was unsure if she was completing correctly. She was able to complete adduction exercises at 60% accuracy  independently, with a breathy quality. Increasing to 80% with moderate verbal and visual cues from the clinician to increase resistance. Pt was also able to complete pitch slides this date given moderate verbal and visual cues from the clinician. Pt participated in sustained phonation of \"ah\" for an average of 8 seconds. She was able to do this independently, however; benefited from moderate verbal and visual cues to continue voicing. Pt often demonstrated vocal borden towards the end of each pitch slide, and sustained phonation. She was able to correct after given moderate verbal and visual cues from the clinician. Pt was also able to complete a yawn-sigh to decrease tension and strain in the voice. She required mod to max cues to complete appropriately.     Pt participated in a memory card game this date. She was asked to remember 4 cards, and recall without delay or distraction. Clinician reviewed memory compensatory strategies to aid in this task, including repetition, and physical movement. Pt was able demonstrate understanding and usage of these strategies throughout the task. She was able to recall at 100% accuracy independently. However; she did benefit from repetition of directions to complete this task.     Pt presented with several areas of irritation on the " tongue. Clinician reviewed following up with dentist or physician if they do not go away.     Education was provided about using diaphragm for breathing, proper EMST-150 usage, and completing exercises at home. As well as strategies for memory, including repetition, physical movement, and writing things down.     Plan  SLP Tx Plan:  Continue with POC.   SLP Plan: Skilled SLP  SLP Frequency: 2x/week  Duration: 6 weeks.    Subjective  Lonnie Lehman was pleasant and participated well throughout the session.     General Visit Info  Certification Period Start Date: 02/13/24 Certification Period End Date: 05/13/24   Number of Authorized Treatments : auth required after eval  Total Number of Visits : 4  Insurance Reviewed this date:  yes     Pain  Pain Assessment: 0-10  Pain Score: 0 - No pain    Objective  By discharge Lonnie Lehman will:  Long Term Goal(s):   1. Patient will return to prior level of function with voicing to communicate with a clear vocal quality to be able to be understood by others to get her wants/needs met effectively.    2. Pt will increase cognitive linguistic skills to be able to fully participate in daily tasks without deficit at PLOF.    Short Term Goal(s):   1. Patient will practice respiratory exercises with return demo with 90% accuracy.   ONGOING. Pt was able to use EMST-150 set at 30, at 70% independently, increased to 90% with moderate cues.     2. Patient will be able to phonate with good vocal quality for sustained phonation at 5-10 seconds with mod cues on 90% of trials.  ONGOING.  On words in conversation at 60% independently, increased to 75% with max cues from clinician. She often demonstrated vocal borden towards the end of words, however was able to correct given moderate cues from clinician.    3.  Patient will be able to achieve voicing at the word level of speech at 90% with mod cues.  ONGOING. Pt was able to voice at word and phrase level, during conversation 60% accuracy  "independently, increased to 80% with mod assistance. Pt demonstrated vocal borden towards the end of words, however; was able to correct with moderate cues from clinician.     4. Patient will complete vocal cord relaxation exercises with return demo with 90% accuracy.  ONGOING. Pt was able to complete yawn-sigh this date at 50% independently, increased to 80% with max cues from clinician.     5. Patient will increase breath support to maintain sustained phonation of \"ah\" for 14.4 seconds with 90% accuracy, independently.  ONGOING. Pt was able to sustain \"ah\", for 5-8 seconds with good vocal quality. Pt did often trail off with vocal borden towards the end of phonation. She was able to correct given moderate cues from clinician.     6. Patient will demonstrate understanding and importance of vocal hygiene with visual supports and return demonstration at 90% accuracy.  NOT ADDRESSED.     7. Patient will demonstrate an improvement in short term memory abilities by recalling 5-7 pieces of information with/without delay and with/without distraction each session.   ONGOING. Pt was able to recall 4 cards at 100% accuracy without delay or distraction this date, using memory strategies. Pt did need a repetition of directions to complete this task accurately.     8. Patient will learn and use memory/cognitive compensatory strategies with return demo with 90% accuracy.  ONGOING. Pt was educated about memory compensatory strategies to use including repetition, writing things down, and using physical movement, at 90% accuracy with min cues from clinician.     9.  Patient will be able to complete high level problem solving tasks (e.g. divergent/convergent tasks, sequencing, reasoning, etc.) at 80% with mod cues.   NOT ADDRESSED.     10. Patient will complete 4-5 days of vocal rest to increase ability to return to PLOF.  GOAL MET 2/23/24.     11. Patient/family education to be provided each session.  ONGOING. Education provided about " exercises to complete at home.     Education  Education was provided to pt/family and reviewed how to carryover strategies learned in session to home.      Addie Avina - Student Clinician

## 2024-03-04 ENCOUNTER — APPOINTMENT (OUTPATIENT)
Dept: SPEECH THERAPY | Facility: CLINIC | Age: 79
End: 2024-03-04
Payer: MEDICARE

## 2024-03-06 ENCOUNTER — TREATMENT (OUTPATIENT)
Dept: SPEECH THERAPY | Facility: CLINIC | Age: 79
End: 2024-03-06
Payer: MEDICARE

## 2024-03-06 DIAGNOSIS — R49.0 DYSPHONIA: ICD-10-CM

## 2024-03-06 PROCEDURE — 92507 TX SP LANG VOICE COMM INDIV: CPT | Mod: GN | Performed by: SPEECH-LANGUAGE PATHOLOGIST

## 2024-03-06 ASSESSMENT — PAIN - FUNCTIONAL ASSESSMENT: PAIN_FUNCTIONAL_ASSESSMENT: 0-10

## 2024-03-06 ASSESSMENT — PAIN SCALES - GENERAL: PAINLEVEL_OUTOF10: 0 - NO PAIN

## 2024-03-06 NOTE — PROGRESS NOTES
"Speech-Language Pathology    Outpatient Speech-Language Pathology Treatment    Patient Name: Lonnie Lehman  MRN: [id number]  : 1945  Today's Date: 24  Time Calculation  Start Time: 1030  Stop Time: 1100  Time Calculation (min): 30 min    Current Problem:  Dysphonia     SLP Assessment  Pt completed 5 reps of 5 using EMST-150 set at 30 with good effort. She benefited from moderate cues to blow harder into the EMST-150, as well as to take breaks to catch breath between each rep of 5 breaths.     Pt was able to complete pitch slides this date at 40% accuracy, increasing to 80% when given max verbal and visual cues from the clinician to breathe with diaphragm. She often demonstrated a vocal borden towards the end of the pitch slide, needing redirection and cues from clinician.     Pt was able to participate in a sustained phonation exercise, by holding out \"ah\". Pt was able to do this for an average of 8 seconds, and an average of 75 dB. She benefited from max verbal and visual cues to keep phonating, as well as to breathe with diaphragm before beginning and during the task. She was able to do this at 60% independently, increasing to 80% with max verbal and visual cues from the clinician.     Pt demonstrated a rough vocal quality within conversation. She often had difficulty relying on diaphragm for breath support while voicing. Pt would often push her voice from her throat and had difficulty transitioning into using diaphragm. Pt also demonstrated vocal borden while pushing voice from neck instead of the diaphragm.     Pt participated in a memory card game this date. She was asked to remember 5 cards, and recall them without delay or distraction. Pt utilized memory compensatory strategies including repetition and tapping the cards. She was able to recall at 80% accuracy independently.    Education was provided about using diaphragm for breathing, proper EMST-150 usage, and completing exercises at home. " "    Plan  SLP Tx Plan:  Continue with POC.   SLP Plan: Skilled SLP  SLP Frequency: 2x/week  Duration: 6 weeks.    Subjective  Lonnie Lehman was pleasant and participated well throughout the session.     General Visit Info  Certification Period Start Date: 02/13/24 Certification Period End Date: 05/13/24   Number of Authorized Treatments : auth required after eval  Total Number of Visits : 5  Insurance Reviewed this date:  yes     Pain  Pain Assessment: 0-10  Pain Score: 0 - No pain    Objective  By discharge Lonnie Lehman will:  Long Term Goal(s):   1. Patient will return to prior level of function with voicing to communicate with a clear vocal quality to be able to be understood by others to get her wants/needs met effectively.    2. Pt will increase cognitive linguistic skills to be able to fully participate in daily tasks without deficit at PLOF.    Short Term Goal(s):   1. Patient will practice respiratory exercises with return demo with 90% accuracy.   ONGOING. Pt was able to use EMST-150 set at 30, at 90% independently. She had difficulty using her diaphragm to phonate, needing reminders to do this. She often held her stomach as a reminder to use diaphragm when voicing.     2.  Patient will be able to achieve voicing at the word level of speech at 90% with mod cues.  ONGOING. Pt was able to voice at word and phrase level, during conversation 60% accuracy independently, increased to 80% with max assistance, and reminder to breathe with diaphragm. Pt demonstrated vocal borden while pushing from neck to voice however; was able to correct to using diaphragm with moderate cues from clinician.     3. Patient will complete vocal cord relaxation exercises with return demo with 90% accuracy.  ONGOING. Pt was able to demonstrate yawn-sigh this date at 90% independently, increased to 100% with max cues from clinician.     4. Patient will increase breath support to maintain sustained phonation of \"ah\" for 14.4 seconds with " "90% accuracy, independently.  ONGOING. Pt was able to sustain \"ah\", for 8 seconds, at 75dB with good vocal quality. Pt did often trail off with vocal avina towards the end of phonation, or when not using diaphragm for voicing. She was able to correct given moderate cues from clinician.     5. Patient will demonstrate understanding and importance of vocal hygiene with visual supports and return demonstration at 90% accuracy.  NOT ADDRESSED.     6. Patient will demonstrate an improvement in short term memory abilities by recalling 5-7 pieces of information with/without delay and with/without distraction each session.   ONGOING. Pt was able to recall 5 cards at 80% accuracy without delay or distraction this date.    7. Patient will learn and use memory/cognitive compensatory strategies with return demo with 90% accuracy.  ONGOING. Pt demonstrated usage of memory strategies, including repetition and physical movement at 95% accuracy independently.    8.  Patient will be able to complete high level problem solving tasks (e.g. divergent/convergent tasks, sequencing, reasoning, etc.) at 80% with mod cues.   NOT ADDRESSED.     9. Patient will complete 4-5 days of vocal rest to increase ability to return to PLOF.  GOAL MET 2/23/24.     10. Patient/family education to be provided each session.  ONGOING. Education provided about exercises to complete at home.     Education  Education was provided to pt/family and reviewed how to carryover strategies learned in session to home.      Addie Avina - Student Clinician   "

## 2024-03-08 ENCOUNTER — LAB (OUTPATIENT)
Dept: LAB | Facility: LAB | Age: 79
End: 2024-03-08
Payer: MEDICARE

## 2024-03-08 DIAGNOSIS — I10 ESSENTIAL (PRIMARY) HYPERTENSION: Primary | ICD-10-CM

## 2024-03-08 DIAGNOSIS — Z13.29 ENCOUNTER FOR SCREENING FOR OTHER SUSPECTED ENDOCRINE DISORDER: ICD-10-CM

## 2024-03-08 DIAGNOSIS — E78.5 HYPERLIPIDEMIA, UNSPECIFIED: ICD-10-CM

## 2024-03-08 LAB
ALBUMIN SERPL BCP-MCNC: 3.7 G/DL (ref 3.4–5)
ALP SERPL-CCNC: 80 U/L (ref 33–136)
ALT SERPL W P-5'-P-CCNC: 15 U/L (ref 7–45)
ANION GAP SERPL CALC-SCNC: 11 MMOL/L (ref 10–20)
AST SERPL W P-5'-P-CCNC: 19 U/L (ref 9–39)
BASOPHILS # BLD AUTO: 0.04 X10*3/UL (ref 0–0.1)
BASOPHILS NFR BLD AUTO: 1 %
BILIRUB SERPL-MCNC: 0.6 MG/DL (ref 0–1.2)
BUN SERPL-MCNC: 10 MG/DL (ref 6–23)
CALCIUM SERPL-MCNC: 9.4 MG/DL (ref 8.6–10.6)
CHLORIDE SERPL-SCNC: 107 MMOL/L (ref 98–107)
CHOLEST SERPL-MCNC: 217 MG/DL (ref 0–199)
CHOLESTEROL/HDL RATIO: 3.2
CO2 SERPL-SCNC: 29 MMOL/L (ref 21–32)
CREAT SERPL-MCNC: 0.76 MG/DL (ref 0.5–1.05)
EGFRCR SERPLBLD CKD-EPI 2021: 79 ML/MIN/1.73M*2
EOSINOPHIL # BLD AUTO: 0.12 X10*3/UL (ref 0–0.4)
EOSINOPHIL NFR BLD AUTO: 2.9 %
ERYTHROCYTE [DISTWIDTH] IN BLOOD BY AUTOMATED COUNT: 13.6 % (ref 11.5–14.5)
GLUCOSE SERPL-MCNC: 73 MG/DL (ref 74–99)
HCT VFR BLD AUTO: 35.3 % (ref 36–46)
HDLC SERPL-MCNC: 68.3 MG/DL
HGB BLD-MCNC: 11.3 G/DL (ref 12–16)
IMM GRANULOCYTES # BLD AUTO: 0.01 X10*3/UL (ref 0–0.5)
IMM GRANULOCYTES NFR BLD AUTO: 0.2 % (ref 0–0.9)
LDLC SERPL CALC-MCNC: 134 MG/DL
LYMPHOCYTES # BLD AUTO: 1.74 X10*3/UL (ref 0.8–3)
LYMPHOCYTES NFR BLD AUTO: 42.3 %
MCH RBC QN AUTO: 29.1 PG (ref 26–34)
MCHC RBC AUTO-ENTMCNC: 32 G/DL (ref 32–36)
MCV RBC AUTO: 91 FL (ref 80–100)
MONOCYTES # BLD AUTO: 0.26 X10*3/UL (ref 0.05–0.8)
MONOCYTES NFR BLD AUTO: 6.3 %
NEUTROPHILS # BLD AUTO: 1.94 X10*3/UL (ref 1.6–5.5)
NEUTROPHILS NFR BLD AUTO: 47.3 %
NON HDL CHOLESTEROL: 149 MG/DL (ref 0–149)
NRBC BLD-RTO: 0 /100 WBCS (ref 0–0)
PLATELET # BLD AUTO: 319 X10*3/UL (ref 150–450)
POTASSIUM SERPL-SCNC: 4 MMOL/L (ref 3.5–5.3)
PROT SERPL-MCNC: 6.3 G/DL (ref 6.4–8.2)
RBC # BLD AUTO: 3.88 X10*6/UL (ref 4–5.2)
SODIUM SERPL-SCNC: 143 MMOL/L (ref 136–145)
TRIGL SERPL-MCNC: 74 MG/DL (ref 0–149)
TSH SERPL-ACNC: 1.18 MIU/L (ref 0.44–3.98)
VLDL: 15 MG/DL (ref 0–40)
WBC # BLD AUTO: 4.1 X10*3/UL (ref 4.4–11.3)

## 2024-03-08 PROCEDURE — 36415 COLL VENOUS BLD VENIPUNCTURE: CPT

## 2024-03-08 PROCEDURE — 80053 COMPREHEN METABOLIC PANEL: CPT

## 2024-03-08 PROCEDURE — 85025 COMPLETE CBC W/AUTO DIFF WBC: CPT

## 2024-03-08 PROCEDURE — 84443 ASSAY THYROID STIM HORMONE: CPT

## 2024-03-08 PROCEDURE — 80061 LIPID PANEL: CPT

## 2024-03-11 ENCOUNTER — TREATMENT (OUTPATIENT)
Dept: SPEECH THERAPY | Facility: CLINIC | Age: 79
End: 2024-03-11
Payer: MEDICARE

## 2024-03-11 DIAGNOSIS — R49.0 DYSPHONIA: ICD-10-CM

## 2024-03-11 PROCEDURE — 92507 TX SP LANG VOICE COMM INDIV: CPT | Mod: GN | Performed by: SPEECH-LANGUAGE PATHOLOGIST

## 2024-03-11 ASSESSMENT — PAIN - FUNCTIONAL ASSESSMENT: PAIN_FUNCTIONAL_ASSESSMENT: 0-10

## 2024-03-11 ASSESSMENT — PAIN SCALES - GENERAL: PAINLEVEL_OUTOF10: 0 - NO PAIN

## 2024-03-11 NOTE — PROGRESS NOTES
Speech-Language Pathology    Outpatient Speech-Language Pathology Treatment    Patient Name: Lonnie Lehman  MRN: [id number]  : 1945  Today's Date: 24  Time Calculation  Start Time: 145  Stop Time: 230  Time Calculation (min): 45 min    Current Problem:  Dysphonia     SLP Assessment  Heavy education was provided regarding reducing and eliminating her habitual throat clear.  Pt was given education on how to swallow or take a sip of liquid instead of clearing her throat.  She was able to practice this throughout the session, but required max cues.  She did catch herself some of the time and others she did not.  She was able to manage it at 60% with mod to max cues.   Pt was able to complete yawn sigh x15 at 100%.  She was able to target humming at a comfortable note for 4 seconds to feel when her voice would shift.  She was able to ID when it shifted at 50% with mod cues.  Pt was able to sustain hum for 4 seconds with good vocal quality at 75% with mod cues.   Reviewed how to complete at home and pt took diligent notes to help her at home.       Plan  SLP Tx Plan:  Continue with POC.   SLP Plan: Skilled SLP  SLP Frequency: 2x/week  Duration: 6 weeks.    Subjective  Lonnie Lehman was pleasant and participated well throughout the session.     General Visit Info  Certification Period Start Date: 24 Certification Period End Date: 24   Number of Authorized Treatments : auth required after eval  Total Number of Visits : 6  Insurance Reviewed this date:  yes     Pain  Pain Assessment: 0-10  Pain Score: 0 - No pain    Objective  By discharge Lonnie Lehman will:  Long Term Goal(s):   1. Patient will return to prior level of function with voicing to communicate with a clear vocal quality to be able to be understood by others to get her wants/needs met effectively.    2. Pt will increase cognitive linguistic skills to be able to fully participate in daily tasks without deficit at PLOF.    Short Term  "Goal(s):   1. Patient will practice respiratory exercises with return demo with 90% accuracy.   ONGOING. Pt was able to utilize diaphragmatic breathing with max cues 80% of the time.     2.  Patient will be able to achieve voicing at the word level of speech at 90% with mod cues.  ONGOING. Pt was able to voice at word and phrase level, during conversation 70% accuracy independently, increased to 90% with max assistance, and reminder to breathe with diaphragm.     3. Patient will complete vocal cord relaxation exercises with return demo with 90% accuracy.  ONGOING. Pt was able to demonstrate yawn-sigh this date at 100% x15 independently, humming was also targeted this date.     4. Patient will increase breath support to maintain sustained phonation of \"ah\" for 14.4 seconds with 90% accuracy, independently.  ONGOING. Pt was able to sustain hum for 4 seconds with good vocal quality at 75% with mod cues.      5. Patient will demonstrate understanding and importance of vocal hygiene with visual supports and return demonstration at 90% accuracy.  ONGOING.  Targeted reducing habitual throat clear.  Pt required max cues to achieve this 40% of the time.     6. Patient will demonstrate an improvement in short term memory abilities by recalling 5-7 pieces of information with/without delay and with/without distraction each session.   NOT ADDRESSED.    7. Patient will learn and use memory/cognitive compensatory strategies with return demo with 90% accuracy.  ONGOING. Pt independently wrote down information and discussed how to increase retention of information to when she got home.     8.  Patient will be able to complete high level problem solving tasks (e.g. divergent/convergent tasks, sequencing, reasoning, etc.) at 80% with mod cues.   NOT ADDRESSED.     9. Patient will complete 4-5 days of vocal rest to increase ability to return to PLOF.  GOAL MET 2/23/24.     10. Patient/family education to be provided each " session.  ONGOING. Education provided about exercises to complete at home.     Education  Education was provided to pt/family and reviewed how to carryover strategies learned in session to home.

## 2024-03-13 ENCOUNTER — TREATMENT (OUTPATIENT)
Dept: SPEECH THERAPY | Facility: CLINIC | Age: 79
End: 2024-03-13
Payer: MEDICARE

## 2024-03-13 DIAGNOSIS — R49.0 DYSPHONIA: ICD-10-CM

## 2024-03-13 PROCEDURE — 92507 TX SP LANG VOICE COMM INDIV: CPT | Mod: GN | Performed by: SPEECH-LANGUAGE PATHOLOGIST

## 2024-03-13 ASSESSMENT — PAIN SCALES - GENERAL: PAINLEVEL_OUTOF10: 0 - NO PAIN

## 2024-03-13 ASSESSMENT — PAIN - FUNCTIONAL ASSESSMENT: PAIN_FUNCTIONAL_ASSESSMENT: 0-10

## 2024-03-13 NOTE — PROGRESS NOTES
"Speech-Language Pathology    Outpatient Speech-Language Pathology Treatment    Patient Name: Lonnie Lehman  MRN: [id number]  : 1945  Today's Date: 24  Time Calculation  Start Time: 1330  Stop Time: 1415  Time Calculation (min): 45 min    Current Problem:  Dysphonia     SLP Assessment  Pt completed EMST-150 set at 30 with a 3/4 turn, which was increased this date. She was able to complete 5 sets of 5 with good effort at 100% accuracy. Pt did require a reminder to take a break between each rep/set but was still able to complete independently.   Pt was able to sustain hum at a comfortable pitch for 5-7 seconds at 60% accuracy, independently, increasing to 80% accuracy with mod verbal cues from the clinician with fair quality.  Her breathiness and borden was notably reduced. She required a model from the clinician to do this exercise correctly.   Pt was able to complete 10x yawn sigh at 100% accuracy independently. Pt was also able to complete pitch slides at 60% accuracy independently, increasing to 75% with mod-max verbal and visual cues from the clinician.  Pt was able to sustain \"ah\"for approximately 5 seconds at an average of 66 dB, via a sound  sarina. Pt was able to sustain \"ah\" using good breath support at 70% accuracy, increasing to 80% with mod verbal and visual cues. Pt did often demonstrate vocal borden/strain towards the end of each sustained \"ah\". Pt would often rest her hand on her abdominal muscles and a tactile cue for herself.    Pt cleared her throat 3x during the session; however, was able to catch herself and take a sip of water afterwards. She reported working on this at home using strategies discussed.    Reviewed to complete exercises at home, as well as beginning to read 1-2 pages of a book out loud 1-2 times per day.       Plan  SLP Tx Plan:  Continue with POC.   SLP Plan: Skilled SLP  SLP Frequency: 2x/week  Duration: 6 weeks.    Subjective  Lonnie Lehman was pleasant and " "participated well throughout the session.     General Visit Info  Certification Period Start Date: 02/13/24 Certification Period End Date: 05/13/24   Number of Authorized Treatments : auth required after eval  Total Number of Visits : 7  Insurance Reviewed this date:  yes     Pain  Pain Assessment: 0-10  Pain Score: 0 - No pain    Objective  By discharge Lonnie Lehman will:  Long Term Goal(s):   1. Patient will return to prior level of function with voicing to communicate with a clear vocal quality to be able to be understood by others to get her wants/needs met effectively.    2. Pt will increase cognitive linguistic skills to be able to fully participate in daily tasks without deficit at PLOF.    Short Term Goal(s):   1. Patient will practice respiratory exercises with return demo with 90% accuracy.   ONGOING. Pt was able to utilize diaphragmatic breathing with max cues 80% of the time.     2.  Patient will be able to achieve voicing at the word level of speech at 90% with mod cues.  ONGOING. Pt was able to voice at word and phrase level, during conversation 70% accuracy independently, increased to 90% with max assistance, with notably reduced breathiness and borden.     3. Patient will complete vocal cord relaxation exercises with return demo with 90% accuracy.  ONGOING. Pt was able to demonstrate yawn-sigh this date at 100% x10 independently, humming was also targeted this date.     4. Patient will increase breath support to maintain sustained phonation of \"ah\" for 14.4 seconds with 90% accuracy, independently.  ONGOING. Pt was able to sustain \"ah\"for approximately 5 seconds at an average of 66 dB, using good breath support at 70% accuracy, increasing to 80% with mod cues. Pt would often demonstrate vocal borden/strain towards the end of each \"ah\". Pt was able to sustain hum for 5-7 seconds at 60% accuracy, independently, increasing to 80% accuracy with mod cues.    5. Patient will demonstrate understanding and " importance of vocal hygiene with visual supports and return demonstration at 90% accuracy.  ONGOING.  Targeted reducing habitual throat clear, pt reported working on this at home. She throat cleared 3x during the session and took a sip of water afterwards.    6. Patient will demonstrate an improvement in short term memory abilities by recalling 5-7 pieces of information with/without delay and with/without distraction each session.   NOT ADDRESSED.    7. Patient will learn and use memory/cognitive compensatory strategies with return demo with 90% accuracy.  ONGOING. Pt independently wrote down information provided to her this date.     8.  Patient will be able to complete high level problem solving tasks (e.g. divergent/convergent tasks, sequencing, reasoning, etc.) at 80% with mod cues.   NOT ADDRESSED.     9. Patient will complete 4-5 days of vocal rest to increase ability to return to PLOF.  GOAL MET 2/23/24.     10. Patient/family education to be provided each session.  ONGOING. Education provided about exercises to complete at home.     Education  Education was provided to pt/family and reviewed how to carryover strategies learned in session to home.

## 2024-03-19 ENCOUNTER — TREATMENT (OUTPATIENT)
Dept: SPEECH THERAPY | Facility: CLINIC | Age: 79
End: 2024-03-19
Payer: MEDICARE

## 2024-03-19 DIAGNOSIS — R49.0 DYSPHONIA: ICD-10-CM

## 2024-03-19 PROCEDURE — 92507 TX SP LANG VOICE COMM INDIV: CPT | Mod: GN | Performed by: SPEECH-LANGUAGE PATHOLOGIST

## 2024-03-19 ASSESSMENT — PAIN - FUNCTIONAL ASSESSMENT: PAIN_FUNCTIONAL_ASSESSMENT: 0-10

## 2024-03-19 ASSESSMENT — PAIN SCALES - GENERAL: PAINLEVEL_OUTOF10: 0 - NO PAIN

## 2024-03-19 NOTE — PROGRESS NOTES
Speech-Language Pathology    Outpatient Speech-Language Pathology Treatment    Patient Name: Lonnie Lehman  MRN: [id number]  : 1945  Today's Date: 24  Time Calculation  Start Time: 1300  Stop Time: 1345  Time Calculation (min): 45 min    Current Problem:  Dysphonia     SLP Assessment  Pt was able to complete EMST-150 at 5 sets of 5 with good effort at 30 with 3/4 turn.  This is an increase of 1/2 turn from previous session.   Pt was noted to have significantly increased vocal quality at the conversation level.  She was able to be heard from another room in the office.  Pt was able to maintain volume at an average of 72 dB across the session between exercises and conversation.   She was able to sustain 'ah' for 7-10 seconds with good volume and fair to good  quality.  Pt is becoming more aware of what is good quality versus poor quality and knows to tighten her diaphragm as well as use her throat muscles appropriately.   Pt was able to produce 'may me my mow moo' with good volume at 65 dB and with fair to poor quality. She was able to identify that it did not always sound as good, but did try her best to mitigate the avina and breathiness.   Avina was only noted minimally, however, she was noted to have a dry and almost creaky quality to her voice at times.  This was reduced with sips of water.  Hydration was addressed.   Pt is targeting reduction of throat clearing and she has minimized this greatly.  Reviewed how to continue to address this.  She was noted to clear throat 12 times in session, this is a large reduction.     Plan  SLP Tx Plan:  Continue with POC.   SLP Plan: Skilled SLP  SLP Frequency: 2x/week  Duration: 6 weeks.    Subjective  Lonnie Lehman was pleasant and participated well throughout the session.     General Visit Info  Certification Period Start Date: 24 Certification Period End Date: 24   Number of Authorized Treatments : auth required after eval  Total Number of Visits :  "8  Insurance Reviewed this date:  yes     Pain  Pain Assessment: 0-10  Pain Score: 0 - No pain    Objective  By discharge Lonnie Lehman will:  Long Term Goal(s):   1. Patient will return to prior level of function with voicing to communicate with a clear vocal quality to be able to be understood by others to get her wants/needs met effectively.    2. Pt will increase cognitive linguistic skills to be able to fully participate in daily tasks without deficit at PLOF.    Short Term Goal(s):   1. Patient will practice respiratory exercises with return demo with 90% accuracy.   ONGOING. EMST-150 at 30 with 3/4 turn for 5 sets of 5 reps with good effort and success.     2.  Patient will be able to achieve voicing at the word level of speech at 90% with mod cues.  ONGOING.  Fair to good quality 90% of the time with min cues.     3. Patient will complete vocal cord relaxation exercises with return demo with 90% accuracy.  ONGOING. Deep breathing continues to be utilized suresh 80% with min cues.      4. Patient will increase breath support to maintain sustained phonation of \"ah\" for 14.4 seconds with 90% accuracy, independently.  ONGOING. Sustained phonation for 7-10 seconds with good quality at 72 dB.     5. Patient will demonstrate understanding and importance of vocal hygiene with visual supports and return demonstration at 90% accuracy.  ONGOING.  Reviewed hydration and throat clearing.      6. Patient will demonstrate an improvement in short term memory abilities by recalling 5-7 pieces of information with/without delay and with/without distraction each session.   NOT ADDRESSED.    7. Patient will learn and use memory/cognitive compensatory strategies with return demo with 90% accuracy.  ONGOING. Pt video taped clinician explaining different techniques to assist with recall later.     8.  Patient will be able to complete high level problem solving tasks (e.g. divergent/convergent tasks, sequencing, reasoning, etc.) at " 80% with mod cues.   NOT ADDRESSED.     9. Patient will complete 4-5 days of vocal rest to increase ability to return to PLOF.  GOAL MET 2/23/24.     10. Patient/family education to be provided each session.  ONGOING. Education provided about exercises to complete at home.     Education  Education was provided to pt/family and reviewed how to carryover strategies learned in session to home.

## 2024-03-21 ENCOUNTER — TREATMENT (OUTPATIENT)
Dept: SPEECH THERAPY | Facility: CLINIC | Age: 79
End: 2024-03-21
Payer: MEDICARE

## 2024-03-21 DIAGNOSIS — R49.0 DYSPHONIA: ICD-10-CM

## 2024-03-21 PROCEDURE — 92507 TX SP LANG VOICE COMM INDIV: CPT | Mod: GN | Performed by: SPEECH-LANGUAGE PATHOLOGIST

## 2024-03-21 ASSESSMENT — PAIN SCALES - GENERAL: PAINLEVEL_OUTOF10: 0 - NO PAIN

## 2024-03-21 ASSESSMENT — PAIN - FUNCTIONAL ASSESSMENT: PAIN_FUNCTIONAL_ASSESSMENT: 0-10

## 2024-03-21 NOTE — PROGRESS NOTES
Speech-Language Pathology    Outpatient Speech-Language Pathology Treatment    Patient Name: Lonnie Lehman  MRN: [id number]  : 1945  Today's Date: 24  Time Calculation  Start Time: 1315  Stop Time: 1400  Time Calculation (min): 45 min    Current Problem:  Dysphonia     SLP Assessment  Pt was able to complete EMST-150 at 5 sets of 5 with good effort at 30 with 3/4 turn.  Pt did need cues to breath between each exhale, but otherwise did very well with this.   She was able to sustain phonation with good vocal quality for 10 seconds on average and at most for 12-14 seconds with max cuing.  She is improving at catching herself before she goes to borden or diplophonia and correcting it.    Pt was able to maintain volume of 75 dB on average when talking.  This was louder when standing than when sitting, education for reason for this was provided.   Pt focused on using her diaphragm with max cues this date.  This is improving and is directly linked to improved quality of her voice.   Pt video taped the clinician giving explanation of how to utilize diaphragm to be able to refer back to at home.  She reports that these videos have been very helpful to her.     Plan  SLP Tx Plan:  Continue with POC.   SLP Plan: Skilled SLP  SLP Frequency: 2x/week  Duration: 6 weeks.    Subjective  Lonnie Lehman was pleasant and participated well throughout the session.     General Visit Info  Certification Period Start Date: 24 Certification Period End Date: 24   Number of Authorized Treatments : auth required after eval  Total Number of Visits : 9  Insurance Reviewed this date:  yes     Pain  Pain Assessment: 0-10  Pain Score: 0 - No pain    Objective  By discharge Lonnie Lehman will:  Long Term Goal(s):   1. Patient will return to prior level of function with voicing to communicate with a clear vocal quality to be able to be understood by others to get her wants/needs met effectively.    2. Pt will increase  "cognitive linguistic skills to be able to fully participate in daily tasks without deficit at PLOF.    Short Term Goal(s):   1. Patient will practice respiratory exercises with return demo with 90% accuracy.   ONGOING. EMST-150 at 30 with 3/4 turn for 5 sets of 5 reps with good effort and success.     2.  Patient will be able to achieve voicing at the word level of speech at 90% with mod cues.  ONGOING. Good quality 90% of the time with min cues.     3. Patient will complete vocal cord relaxation exercises with return demo with 90% accuracy.  ONGOING. Deep breathing utilized at 90% with min cues.      4. Patient will increase breath support to maintain sustained phonation of \"ah\" for 14.4 seconds with 90% accuracy, independently.  ONGOING. Sustained phonation for 10 seconds with good quality at 74 dB.     5. Patient will demonstrate understanding and importance of vocal hygiene with visual supports and return demonstration at 90% accuracy.  ONGOING.  Pt controlled throat clearing at 90% this date independently.      6. Patient will demonstrate an improvement in short term memory abilities by recalling 5-7 pieces of information with/without delay and with/without distraction each session.   NOT ADDRESSED.     7. Patient will learn and use memory/cognitive compensatory strategies with return demo with 90% accuracy.  ONGOING. Pt video taped clinician explaining different techniques to assist with recall later.     8.  Patient will be able to complete high level problem solving tasks (e.g. divergent/convergent tasks, sequencing, reasoning, etc.) at 80% with mod cues.   NOT ADDRESSED.     9. Patient will complete 4-5 days of vocal rest to increase ability to return to PLOF.  GOAL MET 2/23/24.     10. Patient/family education to be provided each session.  ONGOING. Education provided about exercises to complete at home.     Education  Education was provided to pt/family and reviewed how to carryover strategies learned in " session to home.

## 2024-03-25 ENCOUNTER — TREATMENT (OUTPATIENT)
Dept: SPEECH THERAPY | Facility: CLINIC | Age: 79
End: 2024-03-25
Payer: MEDICARE

## 2024-03-25 DIAGNOSIS — R49.0 DYSPHONIA: Primary | ICD-10-CM

## 2024-03-25 PROCEDURE — 92507 TX SP LANG VOICE COMM INDIV: CPT | Mod: GN | Performed by: SPEECH-LANGUAGE PATHOLOGIST

## 2024-03-25 ASSESSMENT — PAIN - FUNCTIONAL ASSESSMENT: PAIN_FUNCTIONAL_ASSESSMENT: 0-10

## 2024-03-25 ASSESSMENT — PAIN SCALES - GENERAL: PAINLEVEL_OUTOF10: 0 - NO PAIN

## 2024-03-25 NOTE — PROGRESS NOTES
Speech-Language Pathology    Outpatient Speech-Language Pathology Treatment    Patient Name: Lonnie Lehman  MRN: [id number]  : 1945  Today's Date: 24  Time Calculation  Start Time: 1345  Stop Time: 1430  Time Calculation (min): 45 min    Current Problem:  Dysphonia     SLP Assessment  Pt did not complete EMST-150 because he had left it in the car.  She reports that she has continue to complete at home.    Pt was able to use improved speech patters with min cues.  She is gaining confidence with her improved speech patters with better diaphragmatic breathing.  Pt would occasionally flip to a throat voice with increased gravel to her quality.  She was able to come back out of this with more independence this date.   Pt was able to sustain phonation for on average 10 seconds.  She had much better quality this date.  She did not demonstrate borden this date.  She did have some gravelly quality, but did not have borden this date.    Pt is returning to more of her normal voice.  This has been a difficult transition for her, but she is improving greatly.   Reviewed how to continue skills at home.     Plan  SLP Tx Plan:  Continue with POC.   SLP Plan: Skilled SLP  SLP Frequency: 2x/week  Duration: 6 weeks.    Subjective  Lonnie Lehman was pleasant and participated well throughout the session.     General Visit Info  Certification Period Start Date: 24 Certification Period End Date: 24   Number of Authorized Treatments : auth required after eval  Total Number of Visits : 10  Insurance Reviewed this date:  yes     Pain  Pain Assessment: 0-10  Pain Score: 0 - No pain    Objective  By discharge Lonnie Lehman will:  Long Term Goal(s):   1. Patient will return to prior level of function with voicing to communicate with a clear vocal quality to be able to be understood by others to get her wants/needs met effectively.    2. Pt will increase cognitive linguistic skills to be able to fully participate in daily  "tasks without deficit at PLOF.    Short Term Goal(s):   1. Patient will practice respiratory exercises with return demo with 90% accuracy.   ONGOING. Pt reports that she continues to complete this at home.  She forgot it in the car today.     2.  Patient will be able to achieve voicing at the word level of speech at 90% with mod cues.  ONGOING. Good quality 90% of the time with min cues.     3. Patient will complete vocal cord relaxation exercises with return demo with 90% accuracy.  ONGOING. Deep breathing utilized at 95% with min cues.      4. Patient will increase breath support to maintain sustained phonation of \"ah\" for 14.4 seconds with 90% accuracy, independently.  ONGOING. Sustained phonation for 10 seconds with good quality at 74 dB.     5. Patient will demonstrate understanding and importance of vocal hygiene with visual supports and return demonstration at 90% accuracy.  ONGOING.  Pt controlled throat clearing at 95% this date independently.      6. Patient will demonstrate an improvement in short term memory abilities by recalling 5-7 pieces of information with/without delay and with/without distraction each session.   NOT ADDRESSED.     7. Patient will learn and use memory/cognitive compensatory strategies with return demo with 90% accuracy.  ONGOING. Pt continues to take very diligent notes throughotu the session.     8.  Patient will be able to complete high level problem solving tasks (e.g. divergent/convergent tasks, sequencing, reasoning, etc.) at 80% with mod cues.   NOT ADDRESSED.     9. Patient will complete 4-5 days of vocal rest to increase ability to return to PLOF.  GOAL MET 2/23/24.     10. Patient/family education to be provided each session.  ONGOING. Education provided about exercises to complete at home.     Education  Education was provided to pt/family and reviewed how to carryover strategies learned in session to home.    "

## 2024-03-27 ENCOUNTER — TREATMENT (OUTPATIENT)
Dept: SPEECH THERAPY | Facility: CLINIC | Age: 79
End: 2024-03-27
Payer: MEDICARE

## 2024-03-27 DIAGNOSIS — R49.0 DYSPHONIA: ICD-10-CM

## 2024-03-27 PROCEDURE — 92507 TX SP LANG VOICE COMM INDIV: CPT | Mod: GN | Performed by: SPEECH-LANGUAGE PATHOLOGIST

## 2024-03-27 ASSESSMENT — PAIN SCALES - GENERAL: PAINLEVEL_OUTOF10: 0 - NO PAIN

## 2024-03-27 ASSESSMENT — PAIN - FUNCTIONAL ASSESSMENT: PAIN_FUNCTIONAL_ASSESSMENT: 0-10

## 2024-03-27 NOTE — PROGRESS NOTES
Speech-Language Pathology    Outpatient Speech-Language Pathology Treatment    Patient Name: Lonnie Lehman  MRN: [id number]  : 1945  Today's Date: 24  Time Calculation  Start Time: 1330  Stop Time: 1415  Time Calculation (min): 45 min    Current Problem:  Dysphonia     SLP Assessment  Pt used EMST-150 at 60 with 5 reps of 5 with good effort.  This was an increase from previous session.  Pt continues to report that she completes this at home.   Pt was able to maintain volume at 75 dB on average in conversation.  Pt was able to sustain phonation with better quality for 7 seconds at this time.   Much time was spent providing education on how to take breaks as she was noted to have changes in her vocal quality related to fatigue.  Talked about how to breaks as needed and how to build endurance.  Pt expressed understanding of this.     Plan  SLP Tx Plan:  Continue with POC.   SLP Plan: Skilled SLP  SLP Frequency: 2x/week  Duration: 6 weeks.    Subjective  Lonnie Lehman was pleasant and participated well throughout the session.     General Visit Info  Certification Period Start Date: 24 Certification Period End Date: 24   Number of Authorized Treatments : auth required after eval  Total Number of Visits : 11  Insurance Reviewed this date:  yes     Pain  Pain Assessment: 0-10  Pain Score: 0 - No pain    Objective  By discharge Lonnie Lehman will:  Long Term Goal(s):   1. Patient will return to prior level of function with voicing to communicate with a clear vocal quality to be able to be understood by others to get her wants/needs met effectively.    2. Pt will increase cognitive linguistic skills to be able to fully participate in daily tasks without deficit at PLOF.    Short Term Goal(s):   1. Patient will practice respiratory exercises with return demo with 90% accuracy.   ONGOING. EMST 150 at 60 for 5 reps of 5 with good effort.     2.  Patient will be able to achieve voicing at the word  "level of speech at 90% with mod cues.  ONGOING. Good quality 90% of the time independently.     3. Patient will complete vocal cord relaxation exercises with return demo with 90% accuracy.  NOT ADDRESSED>       4. Patient will increase breath support to maintain sustained phonation of \"ah\" for 14.4 seconds with 90% accuracy, independently.  ONGOING. Sustained phonation for 7 seconds with good quality at 75 dB.     5. Patient will demonstrate understanding and importance of vocal hygiene with visual supports and return demonstration at 90% accuracy.  ONGOING.  Pt controlled throat clearing at 95% this date independently.      6. Patient will demonstrate an improvement in short term memory abilities by recalling 5-7 pieces of information with/without delay and with/without distraction each session.   NOT ADDRESSED.     7. Patient will learn and use memory/cognitive compensatory strategies with return demo with 90% accuracy.  ONGOING. Pt continues to take very diligent notes throughout the session.     8.  Patient will be able to complete high level problem solving tasks (e.g. divergent/convergent tasks, sequencing, reasoning, etc.) at 80% with mod cues.   NOT ADDRESSED.     9. Patient will complete 4-5 days of vocal rest to increase ability to return to PLOF.  GOAL MET 2/23/24.     10. Patient/family education to be provided each session.  ONGOING. Education provided about exercises to complete at home.     Education  Education was provided to pt/family and reviewed how to carryover strategies learned in session to home.    "

## 2024-04-01 ENCOUNTER — TREATMENT (OUTPATIENT)
Dept: SPEECH THERAPY | Facility: CLINIC | Age: 79
End: 2024-04-01
Payer: MEDICARE

## 2024-04-01 DIAGNOSIS — R49.0 DYSPHONIA: ICD-10-CM

## 2024-04-01 PROCEDURE — 92507 TX SP LANG VOICE COMM INDIV: CPT | Mod: GN | Performed by: SPEECH-LANGUAGE PATHOLOGIST

## 2024-04-01 ASSESSMENT — PAIN - FUNCTIONAL ASSESSMENT: PAIN_FUNCTIONAL_ASSESSMENT: 0-10

## 2024-04-01 ASSESSMENT — PAIN SCALES - GENERAL: PAINLEVEL_OUTOF10: 0 - NO PAIN

## 2024-04-01 NOTE — PROGRESS NOTES
Speech-Language Pathology    Outpatient Speech-Language Pathology Treatment    Patient Name: Lonnie Lehman  MRN: [id number]  : 1945  Today's Date: 24       Current Problem:  Dysphonia     SLP Assessment  Pt was able to participate in conversation without deficit 90% of the time.  She was able to demonstrate good voicing without gravel or raspy qualities the majority of the session.  At the beginning of the session, her voice was really clear, however, after about 25-30 minutes, she began to have slight changes in her vocal quality.  This improved when given a rest break.  Reviewed how to utilize this in conversation.  Pt expressed understanding and was able to see the benefits of doing this and reports that she understood how to do this better.   Discussed how to avoid putting strain on her voice by yelling or screaming.  Reviewed how to use other methods at sporting events/performances to indicate her enjoyment of the activity.  She expressed understanding of this.     Plan  SLP Tx Plan:  Continue with POC.   SLP Plan: Skilled SLP  SLP Frequency: 2x/week  Duration: 6 weeks.    Subjective  Lonnie Lheman was pleasant and participated well throughout the session.     General Visit Info  Certification Period Start Date: 24 Certification Period End Date: 24   Number of Authorized Treatments : auth required after eval  Total Number of Visits : 12  Insurance Reviewed this date:  yes     Pain  Pain Assessment: 0-10  Pain Score: 0 - No pain    Objective  By discharge Lonnie Lehman will:  Long Term Goal(s):   1. Patient will return to prior level of function with voicing to communicate with a clear vocal quality to be able to be understood by others to get her wants/needs met effectively.    2. Pt will increase cognitive linguistic skills to be able to fully participate in daily tasks without deficit at PLOF.    Short Term Goal(s):   1. Patient will practice respiratory exercises with return demo  "with 90% accuracy.   ONGOING. Pt continues to address at home.      2.  Patient will be able to achieve voicing at the word level of speech at 90% with mod cues.  ONGOING. Good quality 90% of the time independently.     3. Patient will complete vocal cord relaxation exercises with return demo with 90% accuracy.  NOT ADDRESSED.       4. Patient will increase breath support to maintain sustained phonation of \"ah\" for 14.4 seconds with 90% accuracy, independently.  ONGOING. Conversation throughout entire session with good quality at 75 dB.     5. Patient will demonstrate understanding and importance of vocal hygiene with visual supports and return demonstration at 90% accuracy.  ONGOING.  Pt controlled throat clearing at 100% this date independently.      6. Patient will demonstrate an improvement in short term memory abilities by recalling 5-7 pieces of information with/without delay and with/without distraction each session.   NOT ADDRESSED.     7. Patient will learn and use memory/cognitive compensatory strategies with return demo with 90% accuracy.  ONGOING. Pt reports that she has been doing better with this.     8.  Patient will be able to complete high level problem solving tasks (e.g. divergent/convergent tasks, sequencing, reasoning, etc.) at 80% with mod cues.   NOT ADDRESSED.     9. Patient will complete 4-5 days of vocal rest to increase ability to return to PLOF.  GOAL MET 2/23/24.     10. Patient/family education to be provided each session.  ONGOING. Education provided about exercises to complete at home.     Education  Education was provided to pt/family and reviewed how to carryover strategies learned in session to home.    "

## 2024-04-03 ENCOUNTER — APPOINTMENT (OUTPATIENT)
Dept: OTOLARYNGOLOGY | Facility: CLINIC | Age: 79
End: 2024-04-03
Payer: MEDICARE

## 2024-04-08 ENCOUNTER — DOCUMENTATION (OUTPATIENT)
Dept: SPEECH THERAPY | Facility: CLINIC | Age: 79
End: 2024-04-08
Payer: MEDICARE

## 2024-04-08 ENCOUNTER — APPOINTMENT (OUTPATIENT)
Dept: SPEECH THERAPY | Facility: CLINIC | Age: 79
End: 2024-04-08
Payer: MEDICARE
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
## 2025-05-17 ENCOUNTER — APPOINTMENT (OUTPATIENT)
Dept: RADIOLOGY | Facility: HOSPITAL | Age: 80
DRG: 563 | End: 2025-05-17
Payer: MEDICARE

## 2025-05-17 ENCOUNTER — HOSPITAL ENCOUNTER (INPATIENT)
Facility: HOSPITAL | Age: 80
LOS: 3 days | Discharge: NURSING, REHAB OR LONG-TERM CARE | DRG: 563 | End: 2025-05-21
Attending: STUDENT IN AN ORGANIZED HEALTH CARE EDUCATION/TRAINING PROGRAM | Admitting: INTERNAL MEDICINE
Payer: MEDICARE

## 2025-05-17 DIAGNOSIS — K59.00 CONSTIPATION, UNSPECIFIED CONSTIPATION TYPE: ICD-10-CM

## 2025-05-17 DIAGNOSIS — S82.035A CLOSED NONDISPLACED TRANSVERSE FRACTURE OF LEFT PATELLA, INITIAL ENCOUNTER: Primary | ICD-10-CM

## 2025-05-17 LAB
ALBUMIN SERPL BCP-MCNC: 4.1 G/DL (ref 3.4–5)
ALP SERPL-CCNC: 70 U/L (ref 33–136)
ALT SERPL W P-5'-P-CCNC: 17 U/L (ref 7–45)
ANION GAP SERPL CALC-SCNC: 15 MMOL/L (ref 10–20)
AST SERPL W P-5'-P-CCNC: 23 U/L (ref 9–39)
BASOPHILS # BLD AUTO: 0.02 X10*3/UL (ref 0–0.1)
BASOPHILS NFR BLD AUTO: 0.3 %
BILIRUB SERPL-MCNC: 0.7 MG/DL (ref 0–1.2)
BUN SERPL-MCNC: 9 MG/DL (ref 6–23)
CALCIUM SERPL-MCNC: 9.5 MG/DL (ref 8.6–10.3)
CHLORIDE SERPL-SCNC: 104 MMOL/L (ref 98–107)
CO2 SERPL-SCNC: 26 MMOL/L (ref 21–32)
CREAT SERPL-MCNC: 0.93 MG/DL (ref 0.5–1.05)
EGFRCR SERPLBLD CKD-EPI 2021: 62 ML/MIN/1.73M*2
EOSINOPHIL # BLD AUTO: 0.08 X10*3/UL (ref 0–0.4)
EOSINOPHIL NFR BLD AUTO: 1.2 %
ERYTHROCYTE [DISTWIDTH] IN BLOOD BY AUTOMATED COUNT: 14 % (ref 11.5–14.5)
GLUCOSE SERPL-MCNC: 100 MG/DL (ref 74–99)
HCT VFR BLD AUTO: 39.3 % (ref 36–46)
HGB BLD-MCNC: 12.7 G/DL (ref 12–16)
IMM GRANULOCYTES # BLD AUTO: 0.02 X10*3/UL (ref 0–0.5)
IMM GRANULOCYTES NFR BLD AUTO: 0.3 % (ref 0–0.9)
INR PPP: 1 (ref 0.9–1.1)
LACTATE SERPL-SCNC: 1 MMOL/L (ref 0.4–2)
LYMPHOCYTES # BLD AUTO: 1.15 X10*3/UL (ref 0.8–3)
LYMPHOCYTES NFR BLD AUTO: 17.6 %
MCH RBC QN AUTO: 29.9 PG (ref 26–34)
MCHC RBC AUTO-ENTMCNC: 32.3 G/DL (ref 32–36)
MCV RBC AUTO: 93 FL (ref 80–100)
MONOCYTES # BLD AUTO: 0.29 X10*3/UL (ref 0.05–0.8)
MONOCYTES NFR BLD AUTO: 4.4 %
NEUTROPHILS # BLD AUTO: 4.98 X10*3/UL (ref 1.6–5.5)
NEUTROPHILS NFR BLD AUTO: 76.2 %
NRBC BLD-RTO: 0 /100 WBCS (ref 0–0)
PLATELET # BLD AUTO: 257 X10*3/UL (ref 150–450)
POTASSIUM SERPL-SCNC: 3.8 MMOL/L (ref 3.5–5.3)
PROT SERPL-MCNC: 7.2 G/DL (ref 6.4–8.2)
PROTHROMBIN TIME: 10.8 SECONDS (ref 9.8–12.4)
RBC # BLD AUTO: 4.25 X10*6/UL (ref 4–5.2)
SODIUM SERPL-SCNC: 141 MMOL/L (ref 136–145)
WBC # BLD AUTO: 6.5 X10*3/UL (ref 4.4–11.3)

## 2025-05-17 PROCEDURE — 72125 CT NECK SPINE W/O DYE: CPT

## 2025-05-17 PROCEDURE — 70450 CT HEAD/BRAIN W/O DYE: CPT

## 2025-05-17 PROCEDURE — 72128 CT CHEST SPINE W/O DYE: CPT | Mod: FOREIGN READ | Performed by: RADIOLOGY

## 2025-05-17 PROCEDURE — 80053 COMPREHEN METABOLIC PANEL: CPT | Performed by: NURSE PRACTITIONER

## 2025-05-17 PROCEDURE — 83605 ASSAY OF LACTIC ACID: CPT | Performed by: NURSE PRACTITIONER

## 2025-05-17 PROCEDURE — 74177 CT ABD & PELVIS W/CONTRAST: CPT | Mod: FOREIGN READ | Performed by: RADIOLOGY

## 2025-05-17 PROCEDURE — 85610 PROTHROMBIN TIME: CPT | Performed by: NURSE PRACTITIONER

## 2025-05-17 PROCEDURE — G0378 HOSPITAL OBSERVATION PER HR: HCPCS

## 2025-05-17 PROCEDURE — 2500000004 HC RX 250 GENERAL PHARMACY W/ HCPCS (ALT 636 FOR OP/ED): Mod: JZ | Performed by: NURSE PRACTITIONER

## 2025-05-17 PROCEDURE — 72170 X-RAY EXAM OF PELVIS: CPT | Mod: FOREIGN READ | Performed by: RADIOLOGY

## 2025-05-17 PROCEDURE — 96372 THER/PROPH/DIAG INJ SC/IM: CPT

## 2025-05-17 PROCEDURE — 72170 X-RAY EXAM OF PELVIS: CPT

## 2025-05-17 PROCEDURE — 36415 COLL VENOUS BLD VENIPUNCTURE: CPT | Performed by: NURSE PRACTITIONER

## 2025-05-17 PROCEDURE — 2550000001 HC RX 255 CONTRASTS: Performed by: NURSE PRACTITIONER

## 2025-05-17 PROCEDURE — 71045 X-RAY EXAM CHEST 1 VIEW: CPT

## 2025-05-17 PROCEDURE — 99285 EMERGENCY DEPT VISIT HI MDM: CPT | Mod: 25 | Performed by: STUDENT IN AN ORGANIZED HEALTH CARE EDUCATION/TRAINING PROGRAM

## 2025-05-17 PROCEDURE — 70450 CT HEAD/BRAIN W/O DYE: CPT | Performed by: RADIOLOGY

## 2025-05-17 PROCEDURE — 71260 CT THORAX DX C+: CPT | Mod: FOREIGN READ | Performed by: RADIOLOGY

## 2025-05-17 PROCEDURE — 71260 CT THORAX DX C+: CPT

## 2025-05-17 PROCEDURE — 2500000004 HC RX 250 GENERAL PHARMACY W/ HCPCS (ALT 636 FOR OP/ED): Mod: JZ

## 2025-05-17 PROCEDURE — 73564 X-RAY EXAM KNEE 4 OR MORE: CPT | Mod: LT

## 2025-05-17 PROCEDURE — 72125 CT NECK SPINE W/O DYE: CPT | Performed by: RADIOLOGY

## 2025-05-17 PROCEDURE — 73564 X-RAY EXAM KNEE 4 OR MORE: CPT | Mod: LEFT SIDE | Performed by: RADIOLOGY

## 2025-05-17 PROCEDURE — 85025 COMPLETE CBC W/AUTO DIFF WBC: CPT | Performed by: NURSE PRACTITIONER

## 2025-05-17 PROCEDURE — 72131 CT LUMBAR SPINE W/O DYE: CPT | Mod: FOREIGN READ | Performed by: RADIOLOGY

## 2025-05-17 PROCEDURE — 2500000001 HC RX 250 WO HCPCS SELF ADMINISTERED DRUGS (ALT 637 FOR MEDICARE OP)

## 2025-05-17 PROCEDURE — 71045 X-RAY EXAM CHEST 1 VIEW: CPT | Mod: FOREIGN READ | Performed by: RADIOLOGY

## 2025-05-17 PROCEDURE — 2500000002 HC RX 250 W HCPCS SELF ADMINISTERED DRUGS (ALT 637 FOR MEDICARE OP, ALT 636 FOR OP/ED)

## 2025-05-17 PROCEDURE — 96374 THER/PROPH/DIAG INJ IV PUSH: CPT | Mod: 59

## 2025-05-17 RX ORDER — SODIUM CHLORIDE, SODIUM LACTATE, POTASSIUM CHLORIDE, CALCIUM CHLORIDE 600; 310; 30; 20 MG/100ML; MG/100ML; MG/100ML; MG/100ML
75 INJECTION, SOLUTION INTRAVENOUS CONTINUOUS
Status: ACTIVE | OUTPATIENT
Start: 2025-05-17 | End: 2025-05-18

## 2025-05-17 RX ORDER — ACETAMINOPHEN 325 MG/1
975 TABLET ORAL EVERY 8 HOURS
Status: DISCONTINUED | OUTPATIENT
Start: 2025-05-17 | End: 2025-05-21 | Stop reason: HOSPADM

## 2025-05-17 RX ORDER — OXYBUTYNIN CHLORIDE 5 MG/1
5 TABLET ORAL 3 TIMES DAILY
Status: DISCONTINUED | OUTPATIENT
Start: 2025-05-17 | End: 2025-05-21 | Stop reason: HOSPADM

## 2025-05-17 RX ORDER — HEPARIN SODIUM 5000 [USP'U]/ML
5000 INJECTION, SOLUTION INTRAVENOUS; SUBCUTANEOUS EVERY 8 HOURS
Status: DISCONTINUED | OUTPATIENT
Start: 2025-05-17 | End: 2025-05-21 | Stop reason: HOSPADM

## 2025-05-17 RX ORDER — ATORVASTATIN CALCIUM 10 MG/1
10 TABLET, FILM COATED ORAL NIGHTLY
Status: DISCONTINUED | OUTPATIENT
Start: 2025-05-17 | End: 2025-05-21 | Stop reason: HOSPADM

## 2025-05-17 RX ORDER — HYDROCHLOROTHIAZIDE 12.5 MG/1
12.5 TABLET ORAL DAILY
Status: DISCONTINUED | OUTPATIENT
Start: 2025-05-17 | End: 2025-05-21 | Stop reason: HOSPADM

## 2025-05-17 RX ORDER — HYDROMORPHONE HYDROCHLORIDE 0.2 MG/ML
0.2 INJECTION INTRAMUSCULAR; INTRAVENOUS; SUBCUTANEOUS
Status: DISCONTINUED | OUTPATIENT
Start: 2025-05-17 | End: 2025-05-20

## 2025-05-17 RX ORDER — LOSARTAN POTASSIUM 50 MG/1
100 TABLET ORAL DAILY
Status: DISCONTINUED | OUTPATIENT
Start: 2025-05-17 | End: 2025-05-21 | Stop reason: HOSPADM

## 2025-05-17 RX ADMIN — IOHEXOL 100 ML: 350 INJECTION, SOLUTION INTRAVENOUS at 16:40

## 2025-05-17 RX ADMIN — ACETAMINOPHEN 975 MG: 325 TABLET, FILM COATED ORAL at 23:37

## 2025-05-17 RX ADMIN — HYDROMORPHONE HYDROCHLORIDE 0.5 MG: 1 INJECTION, SOLUTION INTRAMUSCULAR; INTRAVENOUS; SUBCUTANEOUS at 17:25

## 2025-05-17 RX ADMIN — OXYBUTYNIN CHLORIDE 5 MG: 5 TABLET ORAL at 23:37

## 2025-05-17 RX ADMIN — HEPARIN SODIUM 5000 UNITS: 5000 INJECTION, SOLUTION INTRAVENOUS; SUBCUTANEOUS at 23:36

## 2025-05-17 RX ADMIN — HYDROMORPHONE HYDROCHLORIDE 0.3 MG: 1 INJECTION, SOLUTION INTRAMUSCULAR; INTRAVENOUS; SUBCUTANEOUS at 23:43

## 2025-05-17 RX ADMIN — SODIUM CHLORIDE, SODIUM LACTATE, POTASSIUM CHLORIDE, AND CALCIUM CHLORIDE 75 ML/HR: .6; .31; .03; .02 INJECTION, SOLUTION INTRAVENOUS at 23:38

## 2025-05-17 RX ADMIN — ATORVASTATIN CALCIUM 10 MG: 10 TABLET, FILM COATED ORAL at 23:37

## 2025-05-17 SDOH — HEALTH STABILITY: MENTAL HEALTH: HOW OFTEN DO YOU HAVE SIX OR MORE DRINKS ON ONE OCCASION?: NEVER

## 2025-05-17 SDOH — SOCIAL STABILITY: SOCIAL INSECURITY: WITHIN THE LAST YEAR, HAVE YOU BEEN HUMILIATED OR EMOTIONALLY ABUSED IN OTHER WAYS BY YOUR PARTNER OR EX-PARTNER?: NO

## 2025-05-17 SDOH — ECONOMIC STABILITY: HOUSING INSECURITY: AT ANY TIME IN THE PAST 12 MONTHS, WERE YOU HOMELESS OR LIVING IN A SHELTER (INCLUDING NOW)?: NO

## 2025-05-17 SDOH — SOCIAL STABILITY: SOCIAL INSECURITY: DO YOU FEEL UNSAFE GOING BACK TO THE PLACE WHERE YOU ARE LIVING?: NO

## 2025-05-17 SDOH — SOCIAL STABILITY: SOCIAL INSECURITY
WITHIN THE LAST YEAR, HAVE YOU BEEN RAPED OR FORCED TO HAVE ANY KIND OF SEXUAL ACTIVITY BY YOUR PARTNER OR EX-PARTNER?: NO

## 2025-05-17 SDOH — ECONOMIC STABILITY: HOUSING INSECURITY: IN THE LAST 12 MONTHS, WAS THERE A TIME WHEN YOU WERE NOT ABLE TO PAY THE MORTGAGE OR RENT ON TIME?: NO

## 2025-05-17 SDOH — HEALTH STABILITY: MENTAL HEALTH: HOW MANY DRINKS CONTAINING ALCOHOL DO YOU HAVE ON A TYPICAL DAY WHEN YOU ARE DRINKING?: 1 OR 2

## 2025-05-17 SDOH — SOCIAL STABILITY: SOCIAL INSECURITY: WITHIN THE LAST YEAR, HAVE YOU BEEN AFRAID OF YOUR PARTNER OR EX-PARTNER?: NO

## 2025-05-17 SDOH — HEALTH STABILITY: PHYSICAL HEALTH: ON AVERAGE, HOW MANY DAYS PER WEEK DO YOU ENGAGE IN MODERATE TO STRENUOUS EXERCISE (LIKE A BRISK WALK)?: 7 DAYS

## 2025-05-17 SDOH — ECONOMIC STABILITY: FOOD INSECURITY: HOW HARD IS IT FOR YOU TO PAY FOR THE VERY BASICS LIKE FOOD, HOUSING, MEDICAL CARE, AND HEATING?: NOT HARD AT ALL

## 2025-05-17 SDOH — SOCIAL STABILITY: SOCIAL INSECURITY: DO YOU FEEL ANYONE HAS EXPLOITED OR TAKEN ADVANTAGE OF YOU FINANCIALLY OR OF YOUR PERSONAL PROPERTY?: NO

## 2025-05-17 SDOH — ECONOMIC STABILITY: INCOME INSECURITY: IN THE PAST 12 MONTHS HAS THE ELECTRIC, GAS, OIL, OR WATER COMPANY THREATENED TO SHUT OFF SERVICES IN YOUR HOME?: NO

## 2025-05-17 SDOH — ECONOMIC STABILITY: TRANSPORTATION INSECURITY: IN THE PAST 12 MONTHS, HAS LACK OF TRANSPORTATION KEPT YOU FROM MEDICAL APPOINTMENTS OR FROM GETTING MEDICATIONS?: NO

## 2025-05-17 SDOH — HEALTH STABILITY: MENTAL HEALTH: HOW OFTEN DO YOU HAVE A DRINK CONTAINING ALCOHOL?: 2-4 TIMES A MONTH

## 2025-05-17 SDOH — ECONOMIC STABILITY: FOOD INSECURITY: WITHIN THE PAST 12 MONTHS, THE FOOD YOU BOUGHT JUST DIDN'T LAST AND YOU DIDN'T HAVE MONEY TO GET MORE.: NEVER TRUE

## 2025-05-17 SDOH — HEALTH STABILITY: PHYSICAL HEALTH: ON AVERAGE, HOW MANY MINUTES DO YOU ENGAGE IN EXERCISE AT THIS LEVEL?: 30 MIN

## 2025-05-17 SDOH — SOCIAL STABILITY: SOCIAL INSECURITY: ARE THERE ANY APPARENT SIGNS OF INJURIES/BEHAVIORS THAT COULD BE RELATED TO ABUSE/NEGLECT?: NO

## 2025-05-17 SDOH — SOCIAL STABILITY: SOCIAL INSECURITY: HAVE YOU HAD THOUGHTS OF HARMING ANYONE ELSE?: NO

## 2025-05-17 SDOH — SOCIAL STABILITY: SOCIAL INSECURITY: HAS ANYONE EVER THREATENED TO HURT YOUR FAMILY OR YOUR PETS?: NO

## 2025-05-17 SDOH — ECONOMIC STABILITY: FOOD INSECURITY: WITHIN THE PAST 12 MONTHS, YOU WORRIED THAT YOUR FOOD WOULD RUN OUT BEFORE YOU GOT THE MONEY TO BUY MORE.: NEVER TRUE

## 2025-05-17 SDOH — SOCIAL STABILITY: SOCIAL INSECURITY
WITHIN THE LAST YEAR, HAVE YOU BEEN KICKED, HIT, SLAPPED, OR OTHERWISE PHYSICALLY HURT BY YOUR PARTNER OR EX-PARTNER?: NO

## 2025-05-17 SDOH — ECONOMIC STABILITY: HOUSING INSECURITY: IN THE PAST 12 MONTHS, HOW MANY TIMES HAVE YOU MOVED WHERE YOU WERE LIVING?: 0

## 2025-05-17 SDOH — SOCIAL STABILITY: SOCIAL INSECURITY: HAVE YOU HAD ANY THOUGHTS OF HARMING ANYONE ELSE?: NO

## 2025-05-17 SDOH — SOCIAL STABILITY: SOCIAL INSECURITY: ABUSE: ADULT

## 2025-05-17 SDOH — SOCIAL STABILITY: SOCIAL INSECURITY: DOES ANYONE TRY TO KEEP YOU FROM HAVING/CONTACTING OTHER FRIENDS OR DOING THINGS OUTSIDE YOUR HOME?: NO

## 2025-05-17 SDOH — SOCIAL STABILITY: SOCIAL INSECURITY: ARE YOU OR HAVE YOU BEEN THREATENED OR ABUSED PHYSICALLY, EMOTIONALLY, OR SEXUALLY BY ANYONE?: NO

## 2025-05-17 ASSESSMENT — COGNITIVE AND FUNCTIONAL STATUS - GENERAL
STANDING UP FROM CHAIR USING ARMS: A LOT
PATIENT BASELINE BEDBOUND: NO
TURNING FROM BACK TO SIDE WHILE IN FLAT BAD: A LOT
CLIMB 3 TO 5 STEPS WITH RAILING: A LOT
HELP NEEDED FOR BATHING: A LOT
DRESSING REGULAR UPPER BODY CLOTHING: A LITTLE
MOVING TO AND FROM BED TO CHAIR: A LOT
WALKING IN HOSPITAL ROOM: A LOT
MOBILITY SCORE: 12
MOVING FROM LYING ON BACK TO SITTING ON SIDE OF FLAT BED WITH BEDRAILS: A LOT
DAILY ACTIVITIY SCORE: 17
DRESSING REGULAR LOWER BODY CLOTHING: A LOT
TOILETING: A LOT

## 2025-05-17 ASSESSMENT — LIFESTYLE VARIABLES
AUDIT-C TOTAL SCORE: 2
SKIP TO QUESTIONS 9-10: 1

## 2025-05-17 ASSESSMENT — PATIENT HEALTH QUESTIONNAIRE - PHQ9
1. LITTLE INTEREST OR PLEASURE IN DOING THINGS: NOT AT ALL
2. FEELING DOWN, DEPRESSED OR HOPELESS: NOT AT ALL
SUM OF ALL RESPONSES TO PHQ9 QUESTIONS 1 & 2: 0

## 2025-05-17 ASSESSMENT — ACTIVITIES OF DAILY LIVING (ADL)
ADEQUATE_TO_COMPLETE_ADL: YES
HEARING - RIGHT EAR: FUNCTIONAL
DRESSING YOURSELF: NEEDS ASSISTANCE
JUDGMENT_ADEQUATE_SAFELY_COMPLETE_DAILY_ACTIVITIES: YES
WALKS IN HOME: NEEDS ASSISTANCE
ASSISTIVE_DEVICE: BRACE RLE
HEARING - LEFT EAR: FUNCTIONAL
FEEDING YOURSELF: INDEPENDENT
PATIENT'S MEMORY ADEQUATE TO SAFELY COMPLETE DAILY ACTIVITIES?: YES
GROOMING: NEEDS ASSISTANCE
TOILETING: NEEDS ASSISTANCE
BATHING: NEEDS ASSISTANCE
LACK_OF_TRANSPORTATION: NO

## 2025-05-17 ASSESSMENT — COLUMBIA-SUICIDE SEVERITY RATING SCALE - C-SSRS
1. IN THE PAST MONTH, HAVE YOU WISHED YOU WERE DEAD OR WISHED YOU COULD GO TO SLEEP AND NOT WAKE UP?: NO
6. HAVE YOU EVER DONE ANYTHING, STARTED TO DO ANYTHING, OR PREPARED TO DO ANYTHING TO END YOUR LIFE?: NO
2. HAVE YOU ACTUALLY HAD ANY THOUGHTS OF KILLING YOURSELF?: NO

## 2025-05-17 ASSESSMENT — VISUAL ACUITY: OU: 1
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
## 2025-05-18 LAB
ALBUMIN SERPL BCP-MCNC: 3.6 G/DL (ref 3.4–5)
ALP SERPL-CCNC: 60 U/L (ref 33–136)
ALT SERPL W P-5'-P-CCNC: 16 U/L (ref 7–45)
ANION GAP SERPL CALC-SCNC: 11 MMOL/L (ref 10–20)
AST SERPL W P-5'-P-CCNC: 22 U/L (ref 9–39)
BILIRUB SERPL-MCNC: 1 MG/DL (ref 0–1.2)
BUN SERPL-MCNC: 11 MG/DL (ref 6–23)
CALCIUM SERPL-MCNC: 8.7 MG/DL (ref 8.6–10.3)
CHLORIDE SERPL-SCNC: 104 MMOL/L (ref 98–107)
CO2 SERPL-SCNC: 25 MMOL/L (ref 21–32)
CREAT SERPL-MCNC: 0.83 MG/DL (ref 0.5–1.05)
EGFRCR SERPLBLD CKD-EPI 2021: 71 ML/MIN/1.73M*2
ERYTHROCYTE [DISTWIDTH] IN BLOOD BY AUTOMATED COUNT: 14.1 % (ref 11.5–14.5)
GLUCOSE SERPL-MCNC: 88 MG/DL (ref 74–99)
HCT VFR BLD AUTO: 33.4 % (ref 36–46)
HGB BLD-MCNC: 10.7 G/DL (ref 12–16)
MCH RBC QN AUTO: 29.9 PG (ref 26–34)
MCHC RBC AUTO-ENTMCNC: 32 G/DL (ref 32–36)
MCV RBC AUTO: 93 FL (ref 80–100)
NRBC BLD-RTO: 0 /100 WBCS (ref 0–0)
PLATELET # BLD AUTO: 232 X10*3/UL (ref 150–450)
POTASSIUM SERPL-SCNC: 3.9 MMOL/L (ref 3.5–5.3)
PROT SERPL-MCNC: 6.2 G/DL (ref 6.4–8.2)
RBC # BLD AUTO: 3.58 X10*6/UL (ref 4–5.2)
SODIUM SERPL-SCNC: 136 MMOL/L (ref 136–145)
WBC # BLD AUTO: 5.7 X10*3/UL (ref 4.4–11.3)

## 2025-05-18 PROCEDURE — 85027 COMPLETE CBC AUTOMATED: CPT

## 2025-05-18 PROCEDURE — 80053 COMPREHEN METABOLIC PANEL: CPT

## 2025-05-18 PROCEDURE — 2500000004 HC RX 250 GENERAL PHARMACY W/ HCPCS (ALT 636 FOR OP/ED): Mod: JW

## 2025-05-18 PROCEDURE — 2500000001 HC RX 250 WO HCPCS SELF ADMINISTERED DRUGS (ALT 637 FOR MEDICARE OP)

## 2025-05-18 PROCEDURE — 1200000002 HC GENERAL ROOM WITH TELEMETRY DAILY

## 2025-05-18 PROCEDURE — 36415 COLL VENOUS BLD VENIPUNCTURE: CPT

## 2025-05-18 PROCEDURE — 2500000002 HC RX 250 W HCPCS SELF ADMINISTERED DRUGS (ALT 637 FOR MEDICARE OP, ALT 636 FOR OP/ED)

## 2025-05-18 RX ADMIN — ACETAMINOPHEN 975 MG: 325 TABLET, FILM COATED ORAL at 09:23

## 2025-05-18 RX ADMIN — OXYBUTYNIN CHLORIDE 5 MG: 5 TABLET ORAL at 16:59

## 2025-05-18 RX ADMIN — HYDROMORPHONE HYDROCHLORIDE 0.3 MG: 1 INJECTION, SOLUTION INTRAMUSCULAR; INTRAVENOUS; SUBCUTANEOUS at 11:09

## 2025-05-18 RX ADMIN — ACETAMINOPHEN 975 MG: 325 TABLET, FILM COATED ORAL at 16:59

## 2025-05-18 RX ADMIN — OXYBUTYNIN CHLORIDE 5 MG: 5 TABLET ORAL at 20:19

## 2025-05-18 RX ADMIN — OXYBUTYNIN CHLORIDE 5 MG: 5 TABLET ORAL at 09:23

## 2025-05-18 RX ADMIN — HYDROMORPHONE HYDROCHLORIDE 0.3 MG: 1 INJECTION, SOLUTION INTRAMUSCULAR; INTRAVENOUS; SUBCUTANEOUS at 15:01

## 2025-05-18 RX ADMIN — HEPARIN SODIUM 5000 UNITS: 5000 INJECTION, SOLUTION INTRAVENOUS; SUBCUTANEOUS at 16:59

## 2025-05-18 RX ADMIN — ATORVASTATIN CALCIUM 10 MG: 10 TABLET, FILM COATED ORAL at 20:19

## 2025-05-18 ASSESSMENT — COGNITIVE AND FUNCTIONAL STATUS - GENERAL
WALKING IN HOSPITAL ROOM: A LOT
DRESSING REGULAR LOWER BODY CLOTHING: A LOT
MOVING FROM LYING ON BACK TO SITTING ON SIDE OF FLAT BED WITH BEDRAILS: A LOT
DAILY ACTIVITIY SCORE: 14
DRESSING REGULAR UPPER BODY CLOTHING: A LOT
MOVING TO AND FROM BED TO CHAIR: TOTAL
TOILETING: A LOT
STANDING UP FROM CHAIR USING ARMS: TOTAL
MOVING TO AND FROM BED TO CHAIR: A LOT
DRESSING REGULAR LOWER BODY CLOTHING: A LOT
STANDING UP FROM CHAIR USING ARMS: TOTAL
CLIMB 3 TO 5 STEPS WITH RAILING: TOTAL
MOBILITY SCORE: 8
HELP NEEDED FOR BATHING: A LOT
DAILY ACTIVITIY SCORE: 17
CLIMB 3 TO 5 STEPS WITH RAILING: A LOT
HELP NEEDED FOR BATHING: A LOT
WALKING IN HOSPITAL ROOM: TOTAL
PERSONAL GROOMING: A LITTLE
MOBILITY SCORE: 11
MOVING FROM LYING ON BACK TO SITTING ON SIDE OF FLAT BED WITH BEDRAILS: A LITTLE
EATING MEALS: A LITTLE
TOILETING: A LOT
TURNING FROM BACK TO SIDE WHILE IN FLAT BAD: TOTAL
DRESSING REGULAR UPPER BODY CLOTHING: A LITTLE
TURNING FROM BACK TO SIDE WHILE IN FLAT BAD: A LOT

## 2025-05-18 ASSESSMENT — PAIN SCALES - GENERAL
PAINLEVEL_OUTOF10: 3
PAINLEVEL_OUTOF10: 10 - WORST POSSIBLE PAIN
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
## 2025-05-18 NOTE — CARE PLAN
The patient's goals for the shift include      The clinical goals for the shift include maintain safety    Problem: Pain  Goal: Takes deep breaths with improved pain control throughout the shift  Outcome: Progressing  Goal: Turns in bed with improved pain control throughout the shift  Outcome: Progressing     Problem: Fall/Injury  Goal: Not fall by end of shift  Outcome: Progressing

## 2025-05-18 NOTE — CARE PLAN
The patient's goals for the shift include      The clinical goals for the shift include maintain safety      Problem: Fall/Injury  Goal: Not fall by end of shift  Outcome: Progressing     Problem: Pain  Goal: Turns in bed with improved pain control throughout the shift  Outcome: Progressing

## 2025-05-18 NOTE — PROGRESS NOTES
Physical Therapy                 Therapy Communication Note    Patient Name: Lonnie Lehman  MRN: [id number]  Department: [facility]  Room: [address] [place] [address]-  Today's Date: 5/18/2025     Discipline: Physical Therapy    Missed Visit: PT Missed Visit: Yes     Missed Visit Reason: Missed Visit Reason:  (PT orders received and chart reviewed. Pt with L patella fracture and ortho consulted. Will await for updated POC prior to evaluating.)    Missed Time: Attempt
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
## 2025-05-19 LAB
ALBUMIN SERPL BCP-MCNC: 3.3 G/DL (ref 3.4–5)
ALP SERPL-CCNC: 57 U/L (ref 33–136)
ALT SERPL W P-5'-P-CCNC: 17 U/L (ref 7–45)
ANION GAP SERPL CALC-SCNC: 12 MMOL/L (ref 10–20)
AST SERPL W P-5'-P-CCNC: 30 U/L (ref 9–39)
BILIRUB SERPL-MCNC: 0.6 MG/DL (ref 0–1.2)
BUN SERPL-MCNC: 10 MG/DL (ref 6–23)
CALCIUM SERPL-MCNC: 8.3 MG/DL (ref 8.6–10.3)
CHLORIDE SERPL-SCNC: 107 MMOL/L (ref 98–107)
CO2 SERPL-SCNC: 26 MMOL/L (ref 21–32)
CREAT SERPL-MCNC: 0.74 MG/DL (ref 0.5–1.05)
EGFRCR SERPLBLD CKD-EPI 2021: 81 ML/MIN/1.73M*2
ERYTHROCYTE [DISTWIDTH] IN BLOOD BY AUTOMATED COUNT: 13.8 % (ref 11.5–14.5)
GLUCOSE SERPL-MCNC: 93 MG/DL (ref 74–99)
HCT VFR BLD AUTO: 33.8 % (ref 36–46)
HGB BLD-MCNC: 10.6 G/DL (ref 12–16)
MCH RBC QN AUTO: 29.4 PG (ref 26–34)
MCHC RBC AUTO-ENTMCNC: 31.4 G/DL (ref 32–36)
MCV RBC AUTO: 94 FL (ref 80–100)
NRBC BLD-RTO: 0 /100 WBCS (ref 0–0)
PLATELET # BLD AUTO: 211 X10*3/UL (ref 150–450)
POTASSIUM SERPL-SCNC: 3.8 MMOL/L (ref 3.5–5.3)
PROT SERPL-MCNC: 5.8 G/DL (ref 6.4–8.2)
RBC # BLD AUTO: 3.61 X10*6/UL (ref 4–5.2)
SODIUM SERPL-SCNC: 141 MMOL/L (ref 136–145)
WBC # BLD AUTO: 5.8 X10*3/UL (ref 4.4–11.3)

## 2025-05-19 PROCEDURE — 2500000001 HC RX 250 WO HCPCS SELF ADMINISTERED DRUGS (ALT 637 FOR MEDICARE OP)

## 2025-05-19 PROCEDURE — 2500000002 HC RX 250 W HCPCS SELF ADMINISTERED DRUGS (ALT 637 FOR MEDICARE OP, ALT 636 FOR OP/ED)

## 2025-05-19 PROCEDURE — 97161 PT EVAL LOW COMPLEX 20 MIN: CPT | Mod: GP

## 2025-05-19 PROCEDURE — 80053 COMPREHEN METABOLIC PANEL: CPT

## 2025-05-19 PROCEDURE — 36415 COLL VENOUS BLD VENIPUNCTURE: CPT

## 2025-05-19 PROCEDURE — 2500000004 HC RX 250 GENERAL PHARMACY W/ HCPCS (ALT 636 FOR OP/ED)

## 2025-05-19 PROCEDURE — 85027 COMPLETE CBC AUTOMATED: CPT

## 2025-05-19 PROCEDURE — 1200000002 HC GENERAL ROOM WITH TELEMETRY DAILY

## 2025-05-19 PROCEDURE — 97165 OT EVAL LOW COMPLEX 30 MIN: CPT | Mod: GO

## 2025-05-19 RX ORDER — DOCUSATE SODIUM 100 MG/1
100 CAPSULE, LIQUID FILLED ORAL 2 TIMES DAILY
Status: DISCONTINUED | OUTPATIENT
Start: 2025-05-19 | End: 2025-05-21 | Stop reason: HOSPADM

## 2025-05-19 RX ADMIN — DOCUSATE SODIUM 100 MG: 100 CAPSULE, LIQUID FILLED ORAL at 12:15

## 2025-05-19 RX ADMIN — ACETAMINOPHEN 975 MG: 325 TABLET, FILM COATED ORAL at 08:29

## 2025-05-19 RX ADMIN — PSYLLIUM HUSK 1 PACKET: 3.4 POWDER ORAL at 08:29

## 2025-05-19 RX ADMIN — HEPARIN SODIUM 5000 UNITS: 5000 INJECTION, SOLUTION INTRAVENOUS; SUBCUTANEOUS at 15:27

## 2025-05-19 RX ADMIN — OXYBUTYNIN CHLORIDE 5 MG: 5 TABLET ORAL at 20:36

## 2025-05-19 RX ADMIN — ACETAMINOPHEN 975 MG: 325 TABLET, FILM COATED ORAL at 15:27

## 2025-05-19 RX ADMIN — ACETAMINOPHEN 975 MG: 325 TABLET, FILM COATED ORAL at 00:03

## 2025-05-19 RX ADMIN — HYDROMORPHONE HYDROCHLORIDE 0.2 MG: 0.2 INJECTION, SOLUTION INTRAMUSCULAR; INTRAVENOUS; SUBCUTANEOUS at 16:34

## 2025-05-19 RX ADMIN — HEPARIN SODIUM 5000 UNITS: 5000 INJECTION, SOLUTION INTRAVENOUS; SUBCUTANEOUS at 00:03

## 2025-05-19 RX ADMIN — DOCUSATE SODIUM 100 MG: 100 CAPSULE, LIQUID FILLED ORAL at 20:36

## 2025-05-19 RX ADMIN — OXYBUTYNIN CHLORIDE 5 MG: 5 TABLET ORAL at 15:27

## 2025-05-19 RX ADMIN — OXYBUTYNIN CHLORIDE 5 MG: 5 TABLET ORAL at 08:29

## 2025-05-19 RX ADMIN — HEPARIN SODIUM 5000 UNITS: 5000 INJECTION, SOLUTION INTRAVENOUS; SUBCUTANEOUS at 08:29

## 2025-05-19 RX ADMIN — HYDROMORPHONE HYDROCHLORIDE 0.3 MG: 1 INJECTION, SOLUTION INTRAMUSCULAR; INTRAVENOUS; SUBCUTANEOUS at 02:51

## 2025-05-19 RX ADMIN — ATORVASTATIN CALCIUM 10 MG: 10 TABLET, FILM COATED ORAL at 20:36

## 2025-05-19 ASSESSMENT — COGNITIVE AND FUNCTIONAL STATUS - GENERAL
STANDING UP FROM CHAIR USING ARMS: A LOT
TOILETING: A LOT
TOILETING: TOTAL
DAILY ACTIVITIY SCORE: 17
WALKING IN HOSPITAL ROOM: A LOT
MOBILITY SCORE: 11
DRESSING REGULAR UPPER BODY CLOTHING: A LITTLE
PERSONAL GROOMING: A LITTLE
HELP NEEDED FOR BATHING: A LOT
DAILY ACTIVITIY SCORE: 15
DRESSING REGULAR LOWER BODY CLOTHING: A LOT
TURNING FROM BACK TO SIDE WHILE IN FLAT BAD: A LOT
HELP NEEDED FOR BATHING: A LOT
MOVING FROM LYING ON BACK TO SITTING ON SIDE OF FLAT BED WITH BEDRAILS: A LOT
DRESSING REGULAR UPPER BODY CLOTHING: A LITTLE
TURNING FROM BACK TO SIDE WHILE IN FLAT BAD: A LOT
CLIMB 3 TO 5 STEPS WITH RAILING: A LOT
MOVING FROM LYING ON BACK TO SITTING ON SIDE OF FLAT BED WITH BEDRAILS: A LOT
MOVING TO AND FROM BED TO CHAIR: A LOT
WALKING IN HOSPITAL ROOM: A LOT
DRESSING REGULAR LOWER BODY CLOTHING: A LOT
MOVING TO AND FROM BED TO CHAIR: A LOT
CLIMB 3 TO 5 STEPS WITH RAILING: TOTAL
STANDING UP FROM CHAIR USING ARMS: A LOT
MOBILITY SCORE: 12

## 2025-05-19 ASSESSMENT — ACTIVITIES OF DAILY LIVING (ADL)
ADL_ASSISTANCE: INDEPENDENT
BATHING_ASSISTANCE: MODERATE

## 2025-05-19 ASSESSMENT — PAIN - FUNCTIONAL ASSESSMENT
PAIN_FUNCTIONAL_ASSESSMENT: FLACC (FACE, LEGS, ACTIVITY, CRY, CONSOLABILITY)
PAIN_FUNCTIONAL_ASSESSMENT: 0-10

## 2025-05-19 ASSESSMENT — PAIN SCALES - GENERAL
PAINLEVEL_OUTOF10: 0 - NO PAIN
PAINLEVEL_OUTOF10: 0 - NO PAIN
PAINLEVEL_OUTOF10: 5 - MODERATE PAIN

## 2025-05-19 NOTE — PROGRESS NOTES
C/O PAIN LEFT KNEE    NEUROVASCULAR IS  INTACT.    IMP- LEFT PATELLA FX    PLAN- REC NON OP RX.  WBAT WITH KNEE IMMOBILIZER AND F/U XRAYS 7-10 DAYS.  COULD NEED SURGERY IN THE FUTURE BUT NOT NOW.  DISCUSSED WITH PT
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
## 2025-05-19 NOTE — PROGRESS NOTES
Pt sleeping,  awaiting therapy evaluations.   Will follow up this afternoon.  Currently per ortho no insurance at this immediate time.   Brenna Lucero RN TCC    2012  spoke with pt and her sister regarding dc planning,  list from [facility] was provided for preferences.  [facility] for PT is 11.  Will follow up in am.  Brenna Lucero RN TCC
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
## 2025-05-19 NOTE — CARE PLAN
The patient's goals for the shift include      The clinical goals for the shift include maitain safety      Problem: Pain  Goal: Turns in bed with improved pain control throughout the shift  Outcome: Progressing     Problem: Fall/Injury  Goal: Not fall by end of shift  Outcome: Progressing  Goal: Be free from injury by end of the shift  Outcome: Progressing     Problem: Skin  Goal: Prevent/manage excess moisture  Outcome: Progressing  Flowsheets (Taken 5/18/2025 2301)  Prevent/manage excess moisture: Moisturize dry skin  Goal: Prevent/minimize sheer/friction injuries  Outcome: Progressing

## 2025-05-19 NOTE — PROGRESS NOTES
Physical Therapy    Physical Therapy Evaluation    Patient Name: Lonnie Lehman  MRN: [id number]  Today's Date: 5/19/2025   Time Calculation  Start Time: 0952  Stop Time: 1019  Time Calculation (min): 27 min  911/911-A    Assessment/Plan   PT Assessment  PT Assessment Results: Decreased strength, Impaired balance, Decreased mobility  Rehab Prognosis: Fair  Barriers to Discharge Home: Physical needs  Physical Needs: Stair navigation into home limited by function/safety  Evaluation/Treatment Tolerance: Patient tolerated treatment well  Medical Staff Made Aware: Yes  Strengths: Attitude of self  Barriers to Participation: Comorbidities  End of Session Communication: Bedside nurse  Assessment Comment: pt tolerated session. pt required assist during functional mobility to maintain safety. pt presented with decreased functional mobility, strength, and balance. pt would benefit from high int therapy in order to return to PLOF.  End of Session Patient Position: Bed, 2 rail up (call light in reach)  IP OR SWING BED PT PLAN  Inpatient or Swing Bed: Inpatient  PT Plan  Treatment/Interventions: Bed mobility, Transfer training, Gait training, Balance training, Strengthening, Therapeutic exercise, Therapeutic activity  PT Plan: Ongoing PT  PT Frequency: 5 times per week  PT Discharge Recommendations: High intensity level of continued care  PT Recommended Transfer Status: Assist x2  PT - OK to Discharge: Yes (once medically cleared)    Subjective     Current Problem:  1. Closed nondisplaced transverse fracture of left patella, initial encounter          Problem List[1]    General Visit Information:  General  Reason for Referral: PT landen (pt admitted 5/17 with closed nondisplaced transverse fx of left patella. CT of thoracic spines: hiatal hernia and diverticulosis)  Referred By: Rosaura  Past Medical History Relevant to Rehab: L TKA  Co-Treatment: OT  Co-Treatment Reason: to maximize pt safety and mobility  Prior to Session  Communication: Bedside nurse  Patient Position Received: Bed, 2 rail up, Alarm off, not on at start of session  General Comment: pt agreeable to therapy    Home Living:  Home Living  Home Living Comments: lives in split level home alone with 2 MARILIN. 6 step up to bed and bathroom. 6 steps down to laundry. pt has tub shower with GBs, chair, and HHS. pt has std toilet. denies falls. IND in ADLs/iADLs. drives. amb with cane.    Prior Level of Function:  Prior Function Per Pt/Caregiver Report  Level of [place]: Independent with ADLs and functional transfers, Independent with homemaking with ambulation    Precautions:  Precautions  Precautions Comment: WBAT; knee immobilizer; no ROM to L knee; IV; purewick       Objective     Pain:  Pain Assessment  Pain Assessment: 0-10  0-10 (Numeric) Pain Score:  (L shoulder pain 7/10; L knee pain 4/10)  Pain Interventions: Repositioned    Cognition:  Cognition  Overall Cognitive Status: Within Functional Limits  Orientation Level:  (impaired short term memory - asking same questions multiple times throughout session; anxious)    General Assessments:         Sensation  Light Touch: No apparent deficits  Strength  Strength Comments: RLE strength 4-/5 grossly. RLE ROM WFL for pts age. decreased LLE strength/ROM, however unable to assess formally d/t knee immobilizer and recent fx. pt unable to lift LLE during functional mobility and requiring assistance.                   Functional Assessments:     Bed Mobility  Bed Mobility: Yes  Bed Mobility 1  Bed Mobility 1: Supine to sitting, Sitting to supine (completed with modAx2; assist with LLE)  Transfers  Transfer: Yes  Transfer 1  Transfer From 1: Bed to, Sit to, Stand to  Transfer Device 1: Walker (completed with modAx2; v/c for sequencing and safety. assist with LLE)  Ambulation/Gait Training  Ambulation/Gait Training Performed: Yes  Ambulation/Gait Training 1  Surface 1: Level tile  Device 1: Rolling walker (completed side steps to  HOB with modAx2. v/c for sequencing and safety)          Outcome Measures:     [facility] Basic Mobility  Turning from your back to your side while in a flat bed without using bedrails: A lot  Moving from lying on your back to sitting on the side of a flat bed without using bedrails: A lot  Moving to and from bed to chair (including a wheelchair): A lot  Standing up from a chair using your arms (e.g. wheelchair or bedside chair): A lot  To walk in hospital room: A lot  Climbing 3-5 steps with railing: Total  Basic Mobility - Total Score: 11                                                             Goals:  Encounter Problems       Encounter Problems (Active)       PT Problem       STG - Pt will perform a LE ther ex program of 2-3 sets of 10  (Progressing)       Start:  05/19/25    Expected End:  06/02/25            STG - Pt will transition supine <> sitting with modAx1  (Progressing)       Start:  05/19/25    Expected End:  06/02/25            STG - Pt will transfer STS with modAx1 (Progressing)       Start:  05/19/25    Expected End:  06/02/25            STG - Pt will amb 50' using w/w with modAx1  (Progressing)       Start:  05/19/25    Expected End:  06/02/25                 Education Documentation  Precautions, taught by Christina Reinoso PT at 5/19/2025  1:06 PM.  Learner: Patient  Readiness: Acceptance  Method: Explanation  Response: Verbalizes Understanding  Comment: PT POC; precautions    Mobility Training, taught by Christina Reinoso PT at 5/19/2025  1:06 PM.  Learner: Patient  Readiness: Acceptance  Method: Explanation  Response: Verbalizes Understanding  Comment: PT POC; precautions    Education Comments  No comments found.              [1]   Patient Active Problem List  Diagnosis    Tinnitus, left ear    Sensation of plugged ear on left side    Fluid level behind tympanic membrane of left ear    Cataracts, both eyes    Hypertension    Otalgia of left ear    Preglaucoma    Vitreous floaters    Aphonia    Dysphonia     Closed nondisplaced transverse fracture of left patella, initial encounter

## 2025-05-20 ENCOUNTER — APPOINTMENT (OUTPATIENT)
Dept: RADIOLOGY | Facility: HOSPITAL | Age: 80
DRG: 563 | End: 2025-05-20
Payer: MEDICARE

## 2025-05-20 LAB
ALBUMIN SERPL BCP-MCNC: 3.4 G/DL (ref 3.4–5)
ALP SERPL-CCNC: 66 U/L (ref 33–136)
ALT SERPL W P-5'-P-CCNC: 26 U/L (ref 7–45)
ANION GAP SERPL CALC-SCNC: 10 MMOL/L (ref 10–20)
AST SERPL W P-5'-P-CCNC: 47 U/L (ref 9–39)
BILIRUB SERPL-MCNC: 0.7 MG/DL (ref 0–1.2)
BUN SERPL-MCNC: 13 MG/DL (ref 6–23)
CALCIUM SERPL-MCNC: 8.3 MG/DL (ref 8.6–10.3)
CHLORIDE SERPL-SCNC: 107 MMOL/L (ref 98–107)
CO2 SERPL-SCNC: 27 MMOL/L (ref 21–32)
CREAT SERPL-MCNC: 0.8 MG/DL (ref 0.5–1.05)
EGFRCR SERPLBLD CKD-EPI 2021: 74 ML/MIN/1.73M*2
ERYTHROCYTE [DISTWIDTH] IN BLOOD BY AUTOMATED COUNT: 14 % (ref 11.5–14.5)
GLUCOSE SERPL-MCNC: 95 MG/DL (ref 74–99)
HCT VFR BLD AUTO: 33.1 % (ref 36–46)
HGB BLD-MCNC: 10.6 G/DL (ref 12–16)
MCH RBC QN AUTO: 29.9 PG (ref 26–34)
MCHC RBC AUTO-ENTMCNC: 32 G/DL (ref 32–36)
MCV RBC AUTO: 93 FL (ref 80–100)
NRBC BLD-RTO: 0 /100 WBCS (ref 0–0)
PLATELET # BLD AUTO: 207 X10*3/UL (ref 150–450)
POTASSIUM SERPL-SCNC: 3.7 MMOL/L (ref 3.5–5.3)
PROT SERPL-MCNC: 6.1 G/DL (ref 6.4–8.2)
RBC # BLD AUTO: 3.55 X10*6/UL (ref 4–5.2)
SODIUM SERPL-SCNC: 140 MMOL/L (ref 136–145)
WBC # BLD AUTO: 5.6 X10*3/UL (ref 4.4–11.3)

## 2025-05-20 PROCEDURE — 73030 X-RAY EXAM OF SHOULDER: CPT | Mod: LT

## 2025-05-20 PROCEDURE — 2500000001 HC RX 250 WO HCPCS SELF ADMINISTERED DRUGS (ALT 637 FOR MEDICARE OP)

## 2025-05-20 PROCEDURE — 73140 X-RAY EXAM OF FINGER(S): CPT | Mod: LT

## 2025-05-20 PROCEDURE — 2500000004 HC RX 250 GENERAL PHARMACY W/ HCPCS (ALT 636 FOR OP/ED): Mod: JZ

## 2025-05-20 PROCEDURE — 97116 GAIT TRAINING THERAPY: CPT | Mod: GP,CQ

## 2025-05-20 PROCEDURE — 2500000002 HC RX 250 W HCPCS SELF ADMINISTERED DRUGS (ALT 637 FOR MEDICARE OP, ALT 636 FOR OP/ED)

## 2025-05-20 PROCEDURE — 36415 COLL VENOUS BLD VENIPUNCTURE: CPT

## 2025-05-20 PROCEDURE — 97535 SELF CARE MNGMENT TRAINING: CPT | Mod: CO,GO

## 2025-05-20 PROCEDURE — 80053 COMPREHEN METABOLIC PANEL: CPT

## 2025-05-20 PROCEDURE — 85027 COMPLETE CBC AUTOMATED: CPT

## 2025-05-20 PROCEDURE — 73140 X-RAY EXAM OF FINGER(S): CPT | Mod: LEFT SIDE | Performed by: RADIOLOGY

## 2025-05-20 PROCEDURE — 1200000002 HC GENERAL ROOM WITH TELEMETRY DAILY

## 2025-05-20 PROCEDURE — 73030 X-RAY EXAM OF SHOULDER: CPT | Mod: LEFT SIDE | Performed by: RADIOLOGY

## 2025-05-20 RX ORDER — OXYCODONE HYDROCHLORIDE 5 MG/1
5 TABLET ORAL EVERY 4 HOURS PRN
Status: DISCONTINUED | OUTPATIENT
Start: 2025-05-20 | End: 2025-05-21 | Stop reason: HOSPADM

## 2025-05-20 RX ADMIN — OXYCODONE HYDROCHLORIDE 5 MG: 5 TABLET ORAL at 21:41

## 2025-05-20 RX ADMIN — ACETAMINOPHEN 975 MG: 325 TABLET, FILM COATED ORAL at 00:52

## 2025-05-20 RX ADMIN — HEPARIN SODIUM 5000 UNITS: 5000 INJECTION, SOLUTION INTRAVENOUS; SUBCUTANEOUS at 23:56

## 2025-05-20 RX ADMIN — HYDROMORPHONE HYDROCHLORIDE 0.2 MG: 0.2 INJECTION, SOLUTION INTRAMUSCULAR; INTRAVENOUS; SUBCUTANEOUS at 08:57

## 2025-05-20 RX ADMIN — ATORVASTATIN CALCIUM 10 MG: 10 TABLET, FILM COATED ORAL at 21:41

## 2025-05-20 RX ADMIN — OXYBUTYNIN CHLORIDE 5 MG: 5 TABLET ORAL at 21:41

## 2025-05-20 RX ADMIN — HEPARIN SODIUM 5000 UNITS: 5000 INJECTION, SOLUTION INTRAVENOUS; SUBCUTANEOUS at 00:52

## 2025-05-20 RX ADMIN — OXYBUTYNIN CHLORIDE 5 MG: 5 TABLET ORAL at 15:59

## 2025-05-20 RX ADMIN — OXYCODONE HYDROCHLORIDE 5 MG: 5 TABLET ORAL at 16:30

## 2025-05-20 RX ADMIN — OXYBUTYNIN CHLORIDE 5 MG: 5 TABLET ORAL at 08:58

## 2025-05-20 RX ADMIN — ACETAMINOPHEN 975 MG: 325 TABLET, FILM COATED ORAL at 15:59

## 2025-05-20 RX ADMIN — HEPARIN SODIUM 5000 UNITS: 5000 INJECTION, SOLUTION INTRAVENOUS; SUBCUTANEOUS at 08:58

## 2025-05-20 RX ADMIN — HEPARIN SODIUM 5000 UNITS: 5000 INJECTION, SOLUTION INTRAVENOUS; SUBCUTANEOUS at 15:59

## 2025-05-20 RX ADMIN — ACETAMINOPHEN 975 MG: 325 TABLET, FILM COATED ORAL at 08:58

## 2025-05-20 ASSESSMENT — COGNITIVE AND FUNCTIONAL STATUS - GENERAL
DRESSING REGULAR UPPER BODY CLOTHING: A LITTLE
MOBILITY SCORE: 16
DRESSING REGULAR LOWER BODY CLOTHING: A LOT
TOILETING: A LOT
WALKING IN HOSPITAL ROOM: A LITTLE
STANDING UP FROM CHAIR USING ARMS: A LITTLE
HELP NEEDED FOR BATHING: A LOT
CLIMB 3 TO 5 STEPS WITH RAILING: TOTAL
DAILY ACTIVITIY SCORE: 17
MOVING FROM LYING ON BACK TO SITTING ON SIDE OF FLAT BED WITH BEDRAILS: A LITTLE
MOVING TO AND FROM BED TO CHAIR: A LITTLE
TURNING FROM BACK TO SIDE WHILE IN FLAT BAD: A LITTLE

## 2025-05-20 ASSESSMENT — PAIN SCALES - GENERAL
PAINLEVEL_OUTOF10: 5 - MODERATE PAIN
PAINLEVEL_OUTOF10: 0 - NO PAIN
PAINLEVEL_OUTOF10: 5 - MODERATE PAIN

## 2025-05-20 ASSESSMENT — PAIN - FUNCTIONAL ASSESSMENT
PAIN_FUNCTIONAL_ASSESSMENT: 0-10

## 2025-05-20 ASSESSMENT — ACTIVITIES OF DAILY LIVING (ADL): HOME_MANAGEMENT_TIME_ENTRY: 15

## 2025-05-20 NOTE — PROGRESS NOTES
Occupational Therapy    OT Treatment    Patient Name: Lonnie Lehman  MRN: [id number]  Department: [facility]  Room: [address] [place] [address]  Today's Date: 5/20/2025  Time Calculation  Start Time: 1120  Stop Time: 1149  Time Calculation (min): 29 min        Assessment:  End of Session Communication: Bedside nurse  End of Session Patient Position: Up in chair, Alarm on     Plan:  Treatment Interventions: ADL retraining, Functional transfer training, UE strengthening/ROM, Endurance training, Patient/family training, Equipment evaluation/education  OT Frequency: 5 times per week  OT Discharge Recommendations: High intensity level of continued care  OT Recommended Transfer Status: Moderate assist, Assist of 2  OT - OK to Discharge: Yes (once cleared by medical team)  Treatment Interventions: ADL retraining, Functional transfer training, UE strengthening/ROM, Endurance training, Patient/family training, Equipment evaluation/education    Subjective   OT Visit Info:     General Visit Info:  General  Family/Caregiver Present:  (sister arriving during therapy session)  Co-Treatment: PT  Co-Treatment Reason: to maximize pt safety and mobility  Prior to Session Communication: Bedside nurse  Patient Position Received: Bed, 2 rail up, Alarm on  General Comment: pt pleasant and agreeable to participate in therapy. c/o feeling like a piece of glass is in L cheek; communicated with RN.  Precautions:  LE Weight Bearing Status: Weight Bearing as Tolerated  Medical Precautions: Fall precautions  Braces Applied: L knee immobilizer  Precautions Comment: tele; purewick; no ROM L knee            Pain:  Pain Assessment  Pain Assessment:  (0/10 at rest; 1/10 during mobility except one instance of 9/10 pain when L knee bending (2/2 improper KI size; educated pt regarding keeping L knee in extension))    Objective    Cognition:  Cognition  Overall Cognitive Status: Within Functional Limits       Activities of Daily Living: UE Dressing  UE Dressing Level of  Assistance: Moderate assistance  UE Dressing Where Assessed: Edge of bed  UE Dressing Comments: don open front gown like amandeep  Functional Standing Tolerance:  Time: 5:00 standing at W  Bed Mobility/Transfers: Bed Mobility  Bed Mobility: Yes  Bed Mobility 1  Bed Mobility 1: Supine to sitting  Level of Assistance 1: Minimum assistance    Transfers  Transfer: Yes  Transfer 1  Transfer Device 1:  (pt completed STS from high bed at [facility] to W. pt required Min A For STS from chair with arms)      Functional Mobility:  Functional Mobility  Functional Mobility Performed: Yes  Functional Mobility 1  Device 1: Rolling walker  Assistance 1: Minimum assistance  Comments 1: pt ambulated in room and out into hallway. pt required numerous standing rest breaks to complete mobility but declined sitting in a chair to rest      Outcome Measures:[facility] Daily Activity  Putting on and taking off regular lower body clothing: A lot  Bathing (including washing, rinsing, drying): A lot  Putting on and taking off regular upper body clothing: A little  Toileting, which includes using toilet, bedpan or urinal: A lot  Taking care of personal grooming such as brushing teeth: None  Eating Meals: None  Daily Activity - Total Score: 17        Education Documentation  ADL Training, taught by PARK Stephenson at 5/20/2025  3:32 PM.  Learner: Patient  Readiness: Acceptance  Method: Explanation  Response: Verbalizes Understanding    Education Comments  No comments found.               Goals:  Encounter Problems       Encounter Problems (Active)       OT Goals       Pt. will perform bathing and dressing with  min. assist using adaptive equipment as needed.  (Progressing)       Start:  05/19/25    Expected End:  06/02/25            Pt. will perform toileting with min. assist using dme/adaptive equipment as needed.  (Progressing)       Start:  05/19/25    Expected End:  06/02/25            Pt. will perform bed mobility/chair/commode transfers with  min. assist. (Progressing)       Start:  05/19/25    Expected End:  06/02/25            Pt.  will perform BUE therapeutic exercises x 10 min. as tolerated to improve endurance for functional transfers/self-care tasks.  (Progressing)       Start:  05/19/25    Expected End:  06/02/25            Pt. will tolerate 8-10 min. of standing tasks with cga in preparation for grooming at sink.  (Progressing)       Start:  05/19/25    Expected End:  06/02/25

## 2025-05-20 NOTE — PROGRESS NOTES
Left knee immobilizer sliding down toward ankle but still extends above knee, knee has mild swelling.  Discussed at length the stiffness in this knee and prior problems and rationale to proceed with nonoperative treatment remains best option for her patella fracture.  Discussed obtaining a longer knee immobilizer to be more protective and to help keep in place that is going above the knee or having the knee centered in the immobilizer.    Patient complaining of left shoulder pain and difficulty using it to reach.  Symptoms began with the recent injury.  Exam left shoulder shows significant weakness on attempted abduction and external rotation which is not present on the right gentle motion suggest the glenohumeral joint is well aligned no obvious deformity present.  Findings most consistent with rotator cuff tear.  Patient advised we will obtain shoulder x-rays.    Patient's left long finger is also sore mild swelling in the proximal phalangeal area no deformity noted motion intact.  X-rays left small finger ordered.    Okay to be full weightbearing on left foot and to use left arm as able.
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
## 2025-05-20 NOTE — CARE PLAN
The patient's goals for the shift include      The clinical goals for the shift include maintain safety      Problem: Pain  Goal: Takes deep breaths with improved pain control throughout the shift  Outcome: Progressing  Goal: Turns in bed with improved pain control throughout the shift  Outcome: Progressing     Problem: Fall/Injury  Goal: Not fall by end of shift  Outcome: Progressing  Goal: Be free from injury by end of the shift  Outcome: Progressing     Problem: Skin  Goal: Prevent/manage excess moisture  Outcome: Progressing  Goal: Prevent/minimize sheer/friction injuries  Outcome: Progressing  Goal: Promote/optimize nutrition  Outcome: Progressing  Goal: Promote skin healing  Flowsheets (Taken 5/19/2025 6916)  Promote skin healing: Assess skin/pad under line(s)/device(s)

## 2025-05-20 NOTE — PROGRESS NOTES
Physical Therapy    Physical Therapy Treatment    Patient Name: Lonnie Lehman  MRN: [id number]  Department: [facility]  Room: [address] [place] [address]  Today's Date: 5/20/2025  Time Calculation  Start Time: 1119  Stop Time: 1150  Time Calculation (min): 31 min         Assessment/Plan   PT Assessment  End of Session Communication: Bedside nurse  End of Session Patient Position: Up in chair, Alarm on     PT Plan  Treatment/Interventions: Bed mobility, Transfer training, Gait training, Balance training, Strengthening, Therapeutic exercise, Therapeutic activity  PT Plan: Ongoing PT  PT Frequency: 5 times per week  PT Discharge Recommendations: High intensity level of continued care  PT Recommended Transfer Status: Assist x2  PT - OK to Discharge: Yes (once medically cleared)      General Visit Information:   General  Family/Caregiver Present:  (pt's sister arriving partway through tx session)  Co-Treatment: OT  Co-Treatment Reason: to maximize pt safety and mobility  Prior to Session Communication: Bedside nurse  Patient Position Received: Bed, 2 rail up, Alarm on  General Comment:  (pt pleasant and agreeable to participate in therapy.  c/o feeling like a piece of glass is in L cheek; communicated with RN.)    Subjective   Precautions:  Precautions  LE Weight Bearing Status:  (LLE WBAT)  Medical Precautions: Fall precautions  Braces Applied: L knee immobilizer (too short;  longer immobilizer ordered per pt and last ortho note)  Precautions Comment: tele; purewick; no ROM L knee            Objective   Pain:  Pain Assessment  Pain Assessment: 0-10  0-10 (Numeric) Pain Score:  (0/10 at rest;  1/10 during mobility except one instance of 9/10 pain when L knee bending (2/2 improper KI size; educated pt regarding keeping L knee in extension))    Cognition:  Cognition  Overall Cognitive Status: Within Functional Limits     Treatments:  Bed Mobility 1  Bed Mobility 1:  (sup > sit with min A x 1)    Ambulation/Gait Training  Ambulation/Gait Training  Performed:  (pt ambulates in hallway >/=40 ft x 2 with several brief standing rest breaks throughout 2/2 fatigue.  after seated rest break pt ambs additional 10 ft.  FWW and min A x 1 plus chair follow for safety.  cuing for sequencing with walker.)    Transfers  Transfer:  (sit <> stand x2 trials with FWW.  CGA from high bed; min A x 1 from low visitor chair.  cuing for safe hand placement/ sequencing.)    Outcome Measures:  [facility] Basic Mobility  Turning from your back to your side while in a flat bed without using bedrails: A little  Moving from lying on your back to sitting on the side of a flat bed without using bedrails: A little  Moving to and from bed to chair (including a wheelchair): A little  Standing up from a chair using your arms (e.g. wheelchair or bedside chair): A little  To walk in hospital room: A little  Climbing 3-5 steps with railing: Total  Basic Mobility - Total Score: 16    Education Documentation  Precautions, taught by Sarita Lim PTA at 5/20/2025  3:19 PM.  Learner: Patient  Readiness: Acceptance  Method: Explanation  Response: Verbalizes Understanding    Mobility Training, taught by Sarita Lim PTA at 5/20/2025  3:19 PM.  Learner: Patient  Readiness: Acceptance  Method: Explanation  Response: Verbalizes Understanding    Education Comments  No comments found.        EDUCATION:       Encounter Problems       Encounter Problems (Active)       PT Problem       STG - Pt will perform a LE ther ex program of 2-3 sets of 10  (Progressing)       Start:  05/19/25    Expected End:  06/02/25            STG - Pt will transition supine <> sitting with modAx1  (Progressing)       Start:  05/19/25    Expected End:  06/02/25            STG - Pt will transfer STS with modAx1 (Progressing)       Start:  05/19/25    Expected End:  06/02/25            STG - Pt will amb 50' using w/w with modAx1  (Progressing)       Start:  05/19/25    Expected End:  06/02/25

## 2025-05-20 NOTE — PROGRESS NOTES
Following up for preferences for skilled care--not sure--pt wants to defer with her sister, asking me to come back this afternoon.  Brenna Lucero RN [facility]    1327  preference is [facility] for skilled care.   Asked team to make referral.  Brenna Lucero RN [facility]    1444  2nd preference for skilled care is [place]--asked team to make referral.  No acceptance from either facility at this time.   Brenna Lucero RN [facility]

## 2025-05-21 VITALS
SYSTOLIC BLOOD PRESSURE: 199 MMHG | OXYGEN SATURATION: 96 % | WEIGHT: 190 LBS | RESPIRATION RATE: 16 BRPM | BODY MASS INDEX: 34.75 KG/M2 | DIASTOLIC BLOOD PRESSURE: 87 MMHG | TEMPERATURE: 96.4 F | HEART RATE: 73 BPM

## 2025-05-21 LAB
ALBUMIN SERPL BCP-MCNC: 3.4 G/DL (ref 3.4–5)
ALP SERPL-CCNC: 96 U/L (ref 33–136)
ALT SERPL W P-5'-P-CCNC: 34 U/L (ref 7–45)
ANION GAP SERPL CALC-SCNC: 10 MMOL/L (ref 10–20)
AST SERPL W P-5'-P-CCNC: 53 U/L (ref 9–39)
BILIRUB SERPL-MCNC: 0.6 MG/DL (ref 0–1.2)
BUN SERPL-MCNC: 11 MG/DL (ref 6–23)
CALCIUM SERPL-MCNC: 8.5 MG/DL (ref 8.6–10.3)
CHLORIDE SERPL-SCNC: 106 MMOL/L (ref 98–107)
CO2 SERPL-SCNC: 28 MMOL/L (ref 21–32)
CREAT SERPL-MCNC: 0.73 MG/DL (ref 0.5–1.05)
EGFRCR SERPLBLD CKD-EPI 2021: 83 ML/MIN/1.73M*2
ERYTHROCYTE [DISTWIDTH] IN BLOOD BY AUTOMATED COUNT: 14.2 % (ref 11.5–14.5)
GLUCOSE SERPL-MCNC: 85 MG/DL (ref 74–99)
HCT VFR BLD AUTO: 32.3 % (ref 36–46)
HGB BLD-MCNC: 10.1 G/DL (ref 12–16)
MCH RBC QN AUTO: 29.2 PG (ref 26–34)
MCHC RBC AUTO-ENTMCNC: 31.3 G/DL (ref 32–36)
MCV RBC AUTO: 93 FL (ref 80–100)
NRBC BLD-RTO: 0 /100 WBCS (ref 0–0)
PLATELET # BLD AUTO: 224 X10*3/UL (ref 150–450)
POTASSIUM SERPL-SCNC: 4 MMOL/L (ref 3.5–5.3)
PROT SERPL-MCNC: 6.2 G/DL (ref 6.4–8.2)
RBC # BLD AUTO: 3.46 X10*6/UL (ref 4–5.2)
SODIUM SERPL-SCNC: 140 MMOL/L (ref 136–145)
WBC # BLD AUTO: 5.4 X10*3/UL (ref 4.4–11.3)

## 2025-05-21 PROCEDURE — 36415 COLL VENOUS BLD VENIPUNCTURE: CPT

## 2025-05-21 PROCEDURE — 84075 ASSAY ALKALINE PHOSPHATASE: CPT

## 2025-05-21 PROCEDURE — 2500000002 HC RX 250 W HCPCS SELF ADMINISTERED DRUGS (ALT 637 FOR MEDICARE OP, ALT 636 FOR OP/ED)

## 2025-05-21 PROCEDURE — 2500000001 HC RX 250 WO HCPCS SELF ADMINISTERED DRUGS (ALT 637 FOR MEDICARE OP)

## 2025-05-21 PROCEDURE — 2500000004 HC RX 250 GENERAL PHARMACY W/ HCPCS (ALT 636 FOR OP/ED)

## 2025-05-21 PROCEDURE — 85027 COMPLETE CBC AUTOMATED: CPT

## 2025-05-21 RX ORDER — ACETAMINOPHEN 160 MG/5ML
650 SOLUTION ORAL EVERY 4 HOURS PRN
Status: CANCELLED | OUTPATIENT
Start: 2025-05-21

## 2025-05-21 RX ORDER — HYDRALAZINE HYDROCHLORIDE 20 MG/ML
10 INJECTION INTRAMUSCULAR; INTRAVENOUS EVERY 4 HOURS PRN
Status: DISCONTINUED | OUTPATIENT
Start: 2025-05-21 | End: 2025-05-21 | Stop reason: HOSPADM

## 2025-05-21 RX ORDER — DOCUSATE SODIUM 100 MG/1
100 CAPSULE, LIQUID FILLED ORAL 2 TIMES DAILY
Start: 2025-05-21

## 2025-05-21 RX ORDER — HEPARIN SODIUM 5000 [USP'U]/ML
5000 INJECTION, SOLUTION INTRAVENOUS; SUBCUTANEOUS EVERY 8 HOURS
Start: 2025-05-21

## 2025-05-21 RX ORDER — OXYCODONE AND ACETAMINOPHEN 5; 325 MG/1; MG/1
1 TABLET ORAL EVERY 6 HOURS PRN
Qty: 5 TABLET | Refills: 0 | Status: SHIPPED | OUTPATIENT
Start: 2025-05-21 | End: 2025-05-24

## 2025-05-21 RX ORDER — ACETAMINOPHEN 325 MG/1
975 TABLET ORAL EVERY 8 HOURS
Start: 2025-05-21

## 2025-05-21 RX ORDER — ACETAMINOPHEN 650 MG/1
650 SUPPOSITORY RECTAL EVERY 4 HOURS PRN
Status: CANCELLED | OUTPATIENT
Start: 2025-05-21

## 2025-05-21 RX ORDER — ACETAMINOPHEN 325 MG/1
650 TABLET ORAL EVERY 4 HOURS PRN
Status: CANCELLED | OUTPATIENT
Start: 2025-05-21

## 2025-05-21 RX ADMIN — HEPARIN SODIUM 5000 UNITS: 5000 INJECTION, SOLUTION INTRAVENOUS; SUBCUTANEOUS at 15:56

## 2025-05-21 RX ADMIN — HYDROCHLOROTHIAZIDE 12.5 MG: 12.5 TABLET ORAL at 08:10

## 2025-05-21 RX ADMIN — LOSARTAN POTASSIUM 100 MG: 50 TABLET, FILM COATED ORAL at 08:10

## 2025-05-21 RX ADMIN — DOCUSATE SODIUM 100 MG: 100 CAPSULE, LIQUID FILLED ORAL at 08:10

## 2025-05-21 RX ADMIN — ACETAMINOPHEN 975 MG: 325 TABLET, FILM COATED ORAL at 08:10

## 2025-05-21 RX ADMIN — HEPARIN SODIUM 5000 UNITS: 5000 INJECTION, SOLUTION INTRAVENOUS; SUBCUTANEOUS at 08:11

## 2025-05-21 RX ADMIN — OXYBUTYNIN CHLORIDE 5 MG: 5 TABLET ORAL at 15:56

## 2025-05-21 RX ADMIN — OXYBUTYNIN CHLORIDE 5 MG: 5 TABLET ORAL at 08:11

## 2025-05-21 RX ADMIN — ACETAMINOPHEN 975 MG: 325 TABLET, FILM COATED ORAL at 15:56

## 2025-05-21 RX ADMIN — OXYCODONE HYDROCHLORIDE 5 MG: 5 TABLET ORAL at 11:12

## 2025-05-21 ASSESSMENT — PAIN SCALES - GENERAL
PAINLEVEL_OUTOF10: 5 - MODERATE PAIN
PAINLEVEL_OUTOF10: 0 - NO PAIN

## 2025-05-21 ASSESSMENT — PAIN DESCRIPTION - LOCATION: LOCATION: ARM

## 2025-05-21 ASSESSMENT — PAIN DESCRIPTION - DESCRIPTORS: DESCRIPTORS: ACHING

## 2025-05-21 ASSESSMENT — PAIN DESCRIPTION - ORIENTATION: ORIENTATION: LEFT

## 2025-05-21 ASSESSMENT — PAIN - FUNCTIONAL ASSESSMENT: PAIN_FUNCTIONAL_ASSESSMENT: 0-10

## 2025-05-21 NOTE — DISCHARGE INSTRUCTIONS
Thank you for choosing [facility] - it has been a pleasure taking part in your medical care. Please follow up with your Primary Medical Physician within 1 week of discharge and any specialists as noted within 1-2 weeks for further workup. If your symptoms should persist or worsen, please return immediately to the nearest Emergency Room for further care. If you have any questions about the care you received please call [facility] at [phone].    Medication Changes:  -take percocet as needed for severe pain  -take tylenol as needed for moderate pain  -continue taking Myrbetriq as prescribed    Follow-up Appointments:   -call to schedule an appointment with Dr. Mckeon for follow up

## 2025-05-21 NOTE — PROGRESS NOTES
Physical Therapy                 Therapy Communication Note    Patient Name: Lonnie Lehman  MRN: [id number]  Department: [facility]  Room: [address] [place] [address]  Today's Date: 5/21/2025     Discipline: Physical Therapy    Missed Visit: PT Missed Visit: Yes     Missed Visit Reason:  pt up in chair upon therapy arrival, sister at bedside.  reports being up ambulating around room earlier.  declines participation in therapy 2/2 discharge to post-acute facility planned today, just awaiting transport time; confirmed by communication with [facility].     Missed Time: Attempt at 13:03

## 2025-05-21 NOTE — PROGRESS NOTES
C/O PAIN    NEUROVASCULAR IS  INTACT.    IMP- LEFT PATELLA FX    PLAN- CONT WBAT WITH KNEE IMMOBIZER    WILL NOT FOLLOW N HOSP[ITAL AND WILL SEE IN OFFICE 7-10 DAYS

## 2025-05-21 NOTE — CARE PLAN
The patient's goals for the shift include  pain control    The clinical goals for the shift include safety and comfort      Problem: Pain  Goal: Takes deep breaths with improved pain control throughout the shift  Outcome: Progressing     Problem: Fall/Injury  Goal: Not fall by end of shift  Outcome: Progressing
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
## 2025-05-21 NOTE — PROGRESS NOTES
[facility] can accept pt, asked pre cert team to start pre certing pt.  [facility] 16.   Brenna Lucero RN [facility]
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
